# Patient Record
Sex: FEMALE | Race: WHITE
[De-identification: names, ages, dates, MRNs, and addresses within clinical notes are randomized per-mention and may not be internally consistent; named-entity substitution may affect disease eponyms.]

---

## 2018-03-19 ENCOUNTER — HOSPITAL ENCOUNTER (OUTPATIENT)
Dept: HOSPITAL 17 - NEPE | Age: 83
Setting detail: OBSERVATION
LOS: 9 days | Discharge: HOME | End: 2018-03-28
Attending: HOSPITALIST | Admitting: HOSPITALIST
Payer: MEDICARE

## 2018-03-19 VITALS
OXYGEN SATURATION: 94 % | DIASTOLIC BLOOD PRESSURE: 72 MMHG | HEART RATE: 60 BPM | RESPIRATION RATE: 18 BRPM | SYSTOLIC BLOOD PRESSURE: 148 MMHG | TEMPERATURE: 97.4 F

## 2018-03-19 VITALS — OXYGEN SATURATION: 97 %

## 2018-03-19 DIAGNOSIS — R94.31: ICD-10-CM

## 2018-03-19 DIAGNOSIS — F32.9: ICD-10-CM

## 2018-03-19 DIAGNOSIS — N20.0: ICD-10-CM

## 2018-03-19 DIAGNOSIS — E66.9: ICD-10-CM

## 2018-03-19 DIAGNOSIS — K64.4: ICD-10-CM

## 2018-03-19 DIAGNOSIS — R33.9: ICD-10-CM

## 2018-03-19 DIAGNOSIS — N17.9: ICD-10-CM

## 2018-03-19 DIAGNOSIS — E78.00: ICD-10-CM

## 2018-03-19 DIAGNOSIS — F03.90: ICD-10-CM

## 2018-03-19 DIAGNOSIS — K86.89: ICD-10-CM

## 2018-03-19 DIAGNOSIS — E11.51: ICD-10-CM

## 2018-03-19 DIAGNOSIS — M19.90: ICD-10-CM

## 2018-03-19 DIAGNOSIS — I12.9: ICD-10-CM

## 2018-03-19 DIAGNOSIS — Z86.73: ICD-10-CM

## 2018-03-19 DIAGNOSIS — Z95.0: ICD-10-CM

## 2018-03-19 DIAGNOSIS — E86.0: ICD-10-CM

## 2018-03-19 DIAGNOSIS — I72.3: ICD-10-CM

## 2018-03-19 DIAGNOSIS — K52.9: Primary | ICD-10-CM

## 2018-03-19 DIAGNOSIS — K57.30: ICD-10-CM

## 2018-03-19 DIAGNOSIS — K64.8: ICD-10-CM

## 2018-03-19 DIAGNOSIS — F41.9: ICD-10-CM

## 2018-03-19 DIAGNOSIS — R32: ICD-10-CM

## 2018-03-19 DIAGNOSIS — R94.5: ICD-10-CM

## 2018-03-19 DIAGNOSIS — I25.2: ICD-10-CM

## 2018-03-19 DIAGNOSIS — K80.50: ICD-10-CM

## 2018-03-19 DIAGNOSIS — J44.9: ICD-10-CM

## 2018-03-19 DIAGNOSIS — K21.9: ICD-10-CM

## 2018-03-19 DIAGNOSIS — M54.9: ICD-10-CM

## 2018-03-19 DIAGNOSIS — E11.22: ICD-10-CM

## 2018-03-19 DIAGNOSIS — N18.3: ICD-10-CM

## 2018-03-19 DIAGNOSIS — G89.29: ICD-10-CM

## 2018-03-19 DIAGNOSIS — Z86.711: ICD-10-CM

## 2018-03-19 PROCEDURE — 96368 THER/DIAG CONCURRENT INF: CPT

## 2018-03-19 PROCEDURE — 80076 HEPATIC FUNCTION PANEL: CPT

## 2018-03-19 PROCEDURE — 85610 PROTHROMBIN TIME: CPT

## 2018-03-19 PROCEDURE — 76705 ECHO EXAM OF ABDOMEN: CPT

## 2018-03-19 PROCEDURE — 86038 ANTINUCLEAR ANTIBODIES: CPT

## 2018-03-19 PROCEDURE — 80048 BASIC METABOLIC PNL TOTAL CA: CPT

## 2018-03-19 PROCEDURE — 97530 THERAPEUTIC ACTIVITIES: CPT

## 2018-03-19 PROCEDURE — 87506 IADNA-DNA/RNA PROBE TQ 6-11: CPT

## 2018-03-19 PROCEDURE — 80074 ACUTE HEPATITIS PANEL: CPT

## 2018-03-19 PROCEDURE — A9537 TC99M MEBROFENIN: HCPCS

## 2018-03-19 PROCEDURE — 93005 ELECTROCARDIOGRAM TRACING: CPT

## 2018-03-19 PROCEDURE — 86900 BLOOD TYPING SEROLOGIC ABO: CPT

## 2018-03-19 PROCEDURE — 43262 ENDO CHOLANGIOPANCREATOGRAPH: CPT

## 2018-03-19 PROCEDURE — 00811 ANES LWR INTST NDSC NOS: CPT

## 2018-03-19 PROCEDURE — 82948 REAGENT STRIP/BLOOD GLUCOSE: CPT

## 2018-03-19 PROCEDURE — 83605 ASSAY OF LACTIC ACID: CPT

## 2018-03-19 PROCEDURE — 88305 TISSUE EXAM BY PATHOLOGIST: CPT

## 2018-03-19 PROCEDURE — C1769 GUIDE WIRE: HCPCS

## 2018-03-19 PROCEDURE — 86850 RBC ANTIBODY SCREEN: CPT

## 2018-03-19 PROCEDURE — 82784 ASSAY IGA/IGD/IGG/IGM EACH: CPT

## 2018-03-19 PROCEDURE — 83516 IMMUNOASSAY NONANTIBODY: CPT

## 2018-03-19 PROCEDURE — 96367 TX/PROPH/DG ADDL SEQ IV INF: CPT

## 2018-03-19 PROCEDURE — 96361 HYDRATE IV INFUSION ADD-ON: CPT

## 2018-03-19 PROCEDURE — 85027 COMPLETE CBC AUTOMATED: CPT

## 2018-03-19 PROCEDURE — 45331 SIGMOIDOSCOPY AND BIOPSY: CPT

## 2018-03-19 PROCEDURE — 83540 ASSAY OF IRON: CPT

## 2018-03-19 PROCEDURE — 86255 FLUORESCENT ANTIBODY SCREEN: CPT

## 2018-03-19 PROCEDURE — 74177 CT ABD & PELVIS W/CONTRAST: CPT

## 2018-03-19 PROCEDURE — 83550 IRON BINDING TEST: CPT

## 2018-03-19 PROCEDURE — 80053 COMPREHEN METABOLIC PANEL: CPT

## 2018-03-19 PROCEDURE — 86901 BLOOD TYPING SEROLOGIC RH(D): CPT

## 2018-03-19 PROCEDURE — 96365 THER/PROPH/DIAG IV INF INIT: CPT

## 2018-03-19 PROCEDURE — C9113 INJ PANTOPRAZOLE SODIUM, VIA: HCPCS

## 2018-03-19 PROCEDURE — 97110 THERAPEUTIC EXERCISES: CPT

## 2018-03-19 PROCEDURE — 74330 X-RAY BILE/PANC ENDOSCOPY: CPT

## 2018-03-19 PROCEDURE — 36415 COLL VENOUS BLD VENIPUNCTURE: CPT

## 2018-03-19 PROCEDURE — 43277 ERCP EA DUCT/AMPULLA DILATE: CPT

## 2018-03-19 PROCEDURE — 97163 PT EVAL HIGH COMPLEX 45 MIN: CPT

## 2018-03-19 PROCEDURE — 83520 IMMUNOASSAY QUANT NOS NONAB: CPT

## 2018-03-19 PROCEDURE — 96372 THER/PROPH/DIAG INJ SC/IM: CPT

## 2018-03-19 PROCEDURE — 96366 THER/PROPH/DIAG IV INF ADDON: CPT

## 2018-03-19 PROCEDURE — 85025 COMPLETE CBC W/AUTO DIFF WBC: CPT

## 2018-03-19 PROCEDURE — 82103 ALPHA-1-ANTITRYPSIN TOTAL: CPT

## 2018-03-19 PROCEDURE — 43274 ERCP DUCT STENT PLACEMENT: CPT

## 2018-03-19 PROCEDURE — 78226 HEPATOBILIARY SYSTEM IMAGING: CPT

## 2018-03-19 PROCEDURE — 87493 C DIFF AMPLIFIED PROBE: CPT

## 2018-03-19 PROCEDURE — 43259 EGD US EXAM DUODENUM/JEJUNUM: CPT

## 2018-03-19 PROCEDURE — 00732 ANES UPR GI NDSC PX ERCP: CPT

## 2018-03-19 PROCEDURE — 96376 TX/PRO/DX INJ SAME DRUG ADON: CPT

## 2018-03-19 PROCEDURE — 96375 TX/PRO/DX INJ NEW DRUG ADDON: CPT

## 2018-03-19 PROCEDURE — 82728 ASSAY OF FERRITIN: CPT

## 2018-03-19 PROCEDURE — C2625 STENT, NON-COR, TEM W/DEL SY: HCPCS

## 2018-03-19 PROCEDURE — 82390 ASSAY OF CERULOPLASMIN: CPT

## 2018-03-19 PROCEDURE — G0378 HOSPITAL OBSERVATION PER HR: HCPCS

## 2018-03-20 VITALS
RESPIRATION RATE: 18 BRPM | SYSTOLIC BLOOD PRESSURE: 151 MMHG | HEART RATE: 60 BPM | DIASTOLIC BLOOD PRESSURE: 66 MMHG | OXYGEN SATURATION: 97 %

## 2018-03-20 VITALS
HEART RATE: 60 BPM | DIASTOLIC BLOOD PRESSURE: 65 MMHG | SYSTOLIC BLOOD PRESSURE: 144 MMHG | RESPIRATION RATE: 18 BRPM | OXYGEN SATURATION: 98 %

## 2018-03-20 VITALS
HEART RATE: 60 BPM | TEMPERATURE: 97.5 F | SYSTOLIC BLOOD PRESSURE: 124 MMHG | RESPIRATION RATE: 18 BRPM | DIASTOLIC BLOOD PRESSURE: 54 MMHG | OXYGEN SATURATION: 96 %

## 2018-03-20 VITALS
HEART RATE: 60 BPM | RESPIRATION RATE: 14 BRPM | SYSTOLIC BLOOD PRESSURE: 122 MMHG | TEMPERATURE: 98.6 F | OXYGEN SATURATION: 95 % | DIASTOLIC BLOOD PRESSURE: 58 MMHG

## 2018-03-20 VITALS
OXYGEN SATURATION: 98 % | HEART RATE: 60 BPM | SYSTOLIC BLOOD PRESSURE: 109 MMHG | DIASTOLIC BLOOD PRESSURE: 56 MMHG | TEMPERATURE: 97.4 F | RESPIRATION RATE: 19 BRPM

## 2018-03-20 VITALS
SYSTOLIC BLOOD PRESSURE: 178 MMHG | RESPIRATION RATE: 18 BRPM | DIASTOLIC BLOOD PRESSURE: 77 MMHG | HEART RATE: 61 BPM | OXYGEN SATURATION: 98 %

## 2018-03-20 VITALS — OXYGEN SATURATION: 96 % | HEART RATE: 60 BPM | RESPIRATION RATE: 18 BRPM

## 2018-03-20 VITALS
SYSTOLIC BLOOD PRESSURE: 129 MMHG | HEART RATE: 60 BPM | OXYGEN SATURATION: 98 % | DIASTOLIC BLOOD PRESSURE: 65 MMHG | RESPIRATION RATE: 22 BRPM

## 2018-03-20 VITALS — HEART RATE: 60 BPM

## 2018-03-20 LAB
ALBUMIN SERPL-MCNC: 2.1 GM/DL (ref 3.4–5)
ALP SERPL-CCNC: 139 U/L (ref 45–117)
ALT SERPL-CCNC: 103 U/L (ref 10–53)
AST SERPL-CCNC: 91 U/L (ref 15–37)
BASOPHILS # BLD AUTO: 0.2 TH/MM3 (ref 0–0.2)
BASOPHILS NFR BLD: 1.1 % (ref 0–2)
BILIRUB INDIRECT SERPL-MCNC: 0.3 MG/DL (ref 0–0.8)
BILIRUB SERPL-MCNC: 0.5 MG/DL (ref 0.2–1)
BUN SERPL-MCNC: 23 MG/DL (ref 7–18)
CALCIUM SERPL-MCNC: 8.8 MG/DL (ref 8.5–10.1)
CHLORIDE SERPL-SCNC: 105 MEQ/L (ref 98–107)
CREAT SERPL-MCNC: 1.19 MG/DL (ref 0.5–1)
DIRECT BILIRUBIN ADULT: 0.2 MG/DL (ref 0–0.2)
EOSINOPHIL # BLD: 0.1 TH/MM3 (ref 0–0.4)
EOSINOPHIL NFR BLD: 0.4 % (ref 0–4)
ERYTHROCYTE [DISTWIDTH] IN BLOOD BY AUTOMATED COUNT: 14 % (ref 11.6–17.2)
GFR SERPLBLD BASED ON 1.73 SQ M-ARVRAT: 43 ML/MIN (ref 89–?)
GLUCOSE SERPL-MCNC: 172 MG/DL (ref 74–106)
HCO3 BLD-SCNC: 22.6 MEQ/L (ref 21–32)
HCT VFR BLD CALC: 40.1 % (ref 35–46)
HGB BLD-MCNC: 13.6 GM/DL (ref 11.6–15.3)
INR PPP: 1 RATIO
LYMPHOCYTES # BLD AUTO: 3.7 TH/MM3 (ref 1–4.8)
LYMPHOCYTES NFR BLD AUTO: 20.7 % (ref 9–44)
MCH RBC QN AUTO: 29.4 PG (ref 27–34)
MCHC RBC AUTO-ENTMCNC: 33.9 % (ref 32–36)
MCV RBC AUTO: 86.9 FL (ref 80–100)
MONOCYTE #: 1.9 TH/MM3 (ref 0–0.9)
MONOCYTES NFR BLD: 10.4 % (ref 0–8)
NEUTROPHILS # BLD AUTO: 12.1 TH/MM3 (ref 1.8–7.7)
NEUTROPHILS NFR BLD AUTO: 67.4 % (ref 16–70)
PLATELET # BLD: 248 TH/MM3 (ref 150–450)
PMV BLD AUTO: 9.8 FL (ref 7–11)
PROT SERPL-MCNC: 6.3 GM/DL (ref 6.4–8.2)
PROTHROMBIN TIME: 10.6 SEC (ref 9.8–11.6)
RBC # BLD AUTO: 4.62 MIL/MM3 (ref 4–5.3)
SODIUM SERPL-SCNC: 138 MEQ/L (ref 136–145)
WBC # BLD AUTO: 18 TH/MM3 (ref 4–11)

## 2018-03-20 RX ADMIN — Medication SCH ML: at 08:02

## 2018-03-20 RX ADMIN — ACYCLOVIR SCH UNITS: 800 TABLET ORAL at 10:59

## 2018-03-20 RX ADMIN — INSULIN ASPART SCH: 100 INJECTION, SOLUTION INTRAVENOUS; SUBCUTANEOUS at 12:00

## 2018-03-20 RX ADMIN — SODIUM CHLORIDE SCH MLS/HR: 900 INJECTION, SOLUTION INTRAVENOUS at 08:04

## 2018-03-20 RX ADMIN — CIPROFLOXACIN SCH MLS/HR: 2 INJECTION, SOLUTION INTRAVENOUS at 09:04

## 2018-03-20 RX ADMIN — METOPROLOL TARTRATE SCH MG: 100 TABLET, FILM COATED ORAL at 22:32

## 2018-03-20 RX ADMIN — Medication SCH MG: at 23:04

## 2018-03-20 RX ADMIN — PANTOPRAZOLE SODIUM SCH MG: 40 INJECTION, POWDER, FOR SOLUTION INTRAVENOUS at 05:58

## 2018-03-20 RX ADMIN — METOPROLOL TARTRATE SCH MG: 100 TABLET, FILM COATED ORAL at 09:15

## 2018-03-20 RX ADMIN — INSULIN ASPART SCH: 100 INJECTION, SOLUTION INTRAVENOUS; SUBCUTANEOUS at 21:00

## 2018-03-20 RX ADMIN — SODIUM CHLORIDE AND POTASSIUM CHLORIDE SCH MLS/HR: 9; 1.49 INJECTION, SOLUTION INTRAVENOUS at 22:31

## 2018-03-20 RX ADMIN — Medication SCH ML: at 21:00

## 2018-03-20 RX ADMIN — SODIUM CHLORIDE SCH MLS/HR: 900 INJECTION, SOLUTION INTRAVENOUS at 13:14

## 2018-03-20 RX ADMIN — LEVOTHYROXINE SODIUM SCH MCG: 25 TABLET ORAL at 10:41

## 2018-03-20 RX ADMIN — ATORVASTATIN CALCIUM SCH MG: 20 TABLET, FILM COATED ORAL at 22:32

## 2018-03-20 RX ADMIN — CIPROFLOXACIN SCH MLS/HR: 2 INJECTION, SOLUTION INTRAVENOUS at 22:31

## 2018-03-20 RX ADMIN — PANTOPRAZOLE SODIUM SCH MG: 40 INJECTION, POWDER, FOR SOLUTION INTRAVENOUS at 18:40

## 2018-03-20 RX ADMIN — FAMOTIDINE SCH MG: 20 TABLET, FILM COATED ORAL at 10:41

## 2018-03-20 RX ADMIN — DONEPEZIL HYDROCHLORIDE SCH MG: 5 TABLET, FILM COATED ORAL at 22:32

## 2018-03-20 RX ADMIN — ACYCLOVIR SCH UNITS: 800 TABLET ORAL at 22:31

## 2018-03-20 RX ADMIN — ISOSORBIDE MONONITRATE SCH MG: 60 TABLET, EXTENDED RELEASE ORAL at 10:41

## 2018-03-20 RX ADMIN — GABAPENTIN SCH MG: 100 CAPSULE ORAL at 22:32

## 2018-03-20 RX ADMIN — INSULIN ASPART SCH: 100 INJECTION, SOLUTION INTRAVENOUS; SUBCUTANEOUS at 18:37

## 2018-03-20 RX ADMIN — DILTIAZEM HYDROCHLORIDE SCH MG: 240 CAPSULE, EXTENDED RELEASE ORAL at 10:41

## 2018-03-20 RX ADMIN — SODIUM CHLORIDE AND POTASSIUM CHLORIDE SCH MLS/HR: 9; 1.49 INJECTION, SOLUTION INTRAVENOUS at 10:41

## 2018-03-20 RX ADMIN — SODIUM CHLORIDE SCH MLS/HR: 900 INJECTION, SOLUTION INTRAVENOUS at 02:20

## 2018-03-20 NOTE — HHI.HP
__________________________________________________





[facility]


Service


[facility]


Primary Care Physician


Unknown


Admission Diagnosis





lower GI bleed, possible C. difficile colitis, leukocytosis


Diagnoses:  


Travel History


International Travel<30 Days:  No


Contact w/Intl Traveler <30 Da:  No


Traveled to Known Affected Are:  No


History of Present Illness


Is from patient, ER physician communication, interview of medical records.





Patient was sent from the nursing home because of 3 days duration of diarrhea 

which has number comes melena.


Patient herself is extremely poor historian.  She kept answering I do not 

remember to most of the questioning.


She is however states that she did have nausea vomiting and diarrhea.


She thinks that she has bleeding somewhere.  But she's not sure.


She reports that she is mostly bedbound at the nursing facility.


She relates not to give any proper history.





Review of Systems


ROS Limitations:  Poor Historian (limited historian.  Mostly answered I don't 

remember.)


Except as stated in HPI:  all other systems reviewed are Neg





Past Family Social History


Past Medical History


Hypertension


Diabetes


CVA 2.


COPD


Dementia


Past Surgical History


hysterectomy


appendectomy


Pacemaker placement 2016.


Cholecystectomy


Allergies:  


Coded Allergies:  


     acetaminophen (Unverified  Allergy, Severe, UNKNOWN, 8/15/17)


     oxycodone (Unverified  Allergy, Severe, UNKNOWN, 8/15/17)


     propoxyphene (Unverified  Allergy, Severe, UNKNOWN, 8/15/17)


     *MDRO Multi-Drug Resistant Organism (Verified  Adverse Reaction, Unknown, 6

/3/16)


 ESBL+E.Coli (urine-2016)


Family History


unknown.


Social History


unknown.





Physical Exam


Vital Signs





Vital Signs








  Date Time  Temp Pulse Resp B/P (MAP) Pulse Ox O2 Delivery O2 Flow Rate FiO2


 


3/20/18 04:14  60 18 144/65 (91) 98 Room Air  


 


3/20/18 02:25  61 18 178/77 (110) 98 Nasal Cannula 1.00 


 


3/19/18 23:49     97 Nasal Cannula 1.00 


 


3/19/18 23:44 97.4 60 18 148/72 (97) 94   








Physical Exam


GENERAL: This is a well-nourished, well-developed patient, in no apparent 

distress.


SKIN: No rashes, ecchymoses or lesions. Cool and dry.


HEAD: Atraumatic. Normocephalic. No temporal or scalp tenderness.


EYES: No scleral icterus. No injection or drainage. 


ENT: Nose without bleeding, purulent drainage or septal hematoma. Airway patent.


NECK: Trachea midline. No JVD y. Supple, nontender, no meningeal signs.


CARDIOVASCULAR: Regular rate and rhythm without murmurs, gallops, or rubs. 


RESPIRATORY: Clear to auscultation. Breath sounds equal bilaterally. No wheezes

, rales, or rhonchi.  


GASTROINTESTINAL: Abdomen soft, non-tender, nondistended.No guarding.


MUSCULOSKELETAL: Extremities without clubbing, cyanosis, or edema. No joint 

tenderness, effusion, or edema noted. No calf tenderness. Negative Homans sign 

bilaterally.


NEUROLOGICAL: Awake and alerMotor and sensory grossly within normal limits.  

Normal speech.


Laboratory





Laboratory Tests








Test


  3/19/18


23:45


 


White Blood Count 18.0 


 


Red Blood Count 4.62 


 


Hemoglobin 13.6 


 


Hematocrit 40.1 


 


Mean Corpuscular Volume 86.9 


 


Mean Corpuscular Hemoglobin 29.4 


 


Mean Corpuscular Hemoglobin


Concent 33.9 


 


 


Red Cell Distribution Width 14.0 


 


Platelet Count 248 


 


Mean Platelet Volume 9.8 


 


Neutrophils (%) (Auto) 67.4 


 


Lymphocytes (%) (Auto) 20.7 


 


Monocytes (%) (Auto) 10.4 


 


Eosinophils (%) (Auto) 0.4 


 


Basophils (%) (Auto) 1.1 


 


Neutrophils # (Auto) 12.1 


 


Lymphocytes # (Auto) 3.7 


 


Monocytes # (Auto) 1.9 


 


Eosinophils # (Auto) 0.1 


 


Basophils # (Auto) 0.2 


 


CBC Comment DIFF FINAL 


 


Differential Comment  


 


Prothrombin Time 10.6 


 


Prothromb Time International


Ratio 1.0 


 


 


Stool C. difficile Toxin (PCR) NEGATIVE 


 


Stl C. difficile Toxin


Epiderm 027 PRESUMPTIVE


NEGATIVE


 


Blood Urea Nitrogen 23 


 


Creatinine 1.19 


 


Random Glucose 172 


 


Calcium Level 8.8 


 


Sodium Level 138 


 


Potassium Level 3.5 


 


Chloride Level 105 


 


Carbon Dioxide Level 22.6 


 


Anion Gap 10 


 


Estimat Glomerular Filtration


Rate 43 


 








Result Diagram:  


3/19/18 2345                                                                   

             3/19/18 2345





Imaging





Last 48 hours Impressions








Abdomen/Pelvis CT 3/20/18 0000 Signed





Impressions: 





 Service Date/Time:  2018 07:49 - CONCLUSION:  1. Diffuse 





 thickening of the wall the rectum, sigmoid colon, descending colon, splenic 





 flexure and distal transverse colon suggestive of diffuse colitis. Clinical 





 correlation is recommended.  2. Choledocholithiasis without definite 





 obstruction. Correlation with alkaline phosphatase and bilirubin levels is 





 suggested.  3. 8 mm calcified stone within the left renal pelvis without 





 definite acute obstructive uropathy.  4. Mild chronic prominence of the 





 pelvicalyceal systems and ureters with diffuse thickening of the right 





 collecting system, ureter and urinary bladder wall suggesting possible chronic 





 inflammation.  5. Stable right adrenal nodule measuring 1.8 x 1.1 cm.  6. 





 Diffuse pancreatic atrophy. 7. Stable right common iliac artery aneurysm 





 measuring 2.6 cm in greatest dimension.     Robert W. Levy, MD Caprini VTE Risk Assessment


Caprini VTE Risk Assessment:  Mod/High Risk (score >= 2)


Caprini Risk Assessment Model











 Point Value = 1          Point Value = 2  Point Value = 3  Point Value = 5


 


Age 41-60


Minor surgery


BMI > 25 kg/m2


Swollen legs


Varicose veins


Pregnancy or postpartum


History of unexplained or recurrent


   spontaneous 


Oral contraceptives or hormone


   replacement


Sepsis (< 1 month)


Serious lung disease, including


   pneumonia (< 1 month)


Abnormal pulmonary function


Acute myocardial infarction


Congestive heart failure (< 1 month)


History of inflammatory bowel disease


Medical patient at bed rest Age 61-74


Arthroscopic surgery


Major open surgery (> 45 min)


Laparoscopic surgery (> 45 min)


Malignancy


Confined to bed (> 72 hours)


Immobilizing plaster cast


Central venous access Age >= 75


History of VTE


Family history of VTE


Factor V Leiden


Prothrombin 16606U


Lupus anticoagulant


Anticardiolipin antibodies


Elevated serum homocysteine


Heparin-induced thrombocytopenia


Other congenital or acquired


   thrombophilia Stroke (< 1 month)


Elective arthroplasty


Hip, pelvis, or leg fracture


Acute spinal cord injury (< 1 month)








Prophylaxis Regimen











   Total Risk


Factor Score Risk Level Prophylaxis Regimen


 


0-1      Low Early ambulation


 


2 Moderate Order ONE of the following:


*Sequential Compression Device (SCD)


*Heparin 5000 units SQ BID


 


3-4 Higher Order ONE of the following medications:


*Heparin 5000 units SQ TID


*Enoxaparin/Lovenox 40 mg SQ daily (WT < 150 kg, CrCl > 30 mL/min)


*Enoxaparin/Lovenox 30 mg SQ daily (WT < 150 kg, CrCl > 10-29 mL/min)


*Enoxaparin/Lovenox 30 mg SQ BID (WT < 150 kg, CrCl > 30 mL/min)


AND/OR


*Sequential Compression Device (SCD)


 


5 or more Highest Order ONE of the following medications:


*Heparin 5000 units SQ TID (Preferred with Epidurals)


*Enoxaparin/Lovenox 40 mg SQ daily (WT < 150 kg, CrCl > 30 mL/min)


*Enoxaparin/Lovenox 30 mg SQ daily (WT < 150 kg, CrCl > 10-29 mL/min)


*Enoxaparin/Lovenox 30 mg SQ BID (WT < 150 kg, CrCl > 30 mL/min)


AND


*Sequential Compression Device (SCD)











Assessment and Plan


Assessment and Plan


Impression:





Possible GI bleed


Possible C. difficile colitis versus other infectious colitis


Leukocytosis 


Abdominal pain on examination.  Poor historian.  Also with some palpable 

distention of the right upper quadrant.








Hypertension


Diabetes


CVA 2.


COPD


Chronic kidney disease


Dementia








Plan:





Nothing by mouth.


IV hydration.


GI consult.


Hold oral hypoglycemics.


Protonix 40 mg IV every 12 hours.


Obtain CT abdomen and pelvis with IV contrast to further since patient has 

significant pain on examination.


Until then, nothing by mouth.





Start patient on Cipro 400 with grams IV every 12 hours.


Flagyl 500 mg IV every 6 hours.


Stool studies including C. difficile.





DVT prophylaxis with SCD.


Code Status


DNR.  Patient has [facility] dnr paper in chart.  From nursing home.


Discussed Condition With


Patient, nursing staff, ER physician











George Ennis MD Mar 20, 2018 04:26

## 2018-03-20 NOTE — PD
HPI


Chief Complaint:  Bleeding


Time Seen by Provider:  23:45


Travel History


International Travel<30 days:  No


Contact w/Intl Traveler<30days:  No


Traveled to known affect area:  No





History of Present Illness


HPI


86-year-old female came to the emergency room sent from the nursing home by EMS 

for rectal bleed.  Patient has history of dementia and is not a good historian.

  As per the nursing home to the paramedics patient has had diarrhea for 3 days 

but today he started noticing blood in her stool.  Vital signs are stable.  No 

history of vomiting.  Patient is not on any blood thinners.  Patient otherwise 

does not look uncomfortable.  She is mostly nonverbal.





PFSH


Past Medical History


*** Narrative Medical


List of her past medical, surgical, social and family history reviewed from the 

nursing note


Arthritis:  Yes


Anxiety:  Yes


Depression:  Yes


Heart Rhythm Problems:  Yes (bradycardic )


Cancer:  No


Cardiovascular Problems:  Yes (PVD)


High Cholesterol:  Yes


COPD:  Yes


Cerebrovascular Accident:  Yes


Dementia:  Yes


Diabetes:  Yes


Patient Takes Glucophage:  Yes


Diminished Hearing:  No


Endocrine:  Yes


Genitourinary:  Yes


Hypertension:  Yes


Implanted Vascular Access Dvce:  Yes


Kidney Stones:  Yes


Medical other:  Yes (STROKE; GERD)


Musculoskeletal:  Yes (CHRONIC BACK PAIN)


Neurologic:  Yes


Respiratory:  Yes (PULMONARY EMBOLISM)


Integumentary:  Yes (SHINGLES)


Myocardial Infarction:  Yes


PNEUMOCCOCAL Vaccine (Year):  2


Menopausal:  Yes





Past Surgical History


Abdominal Surgery:  Yes (APPENDECTOMY)


Appendectomy:  Yes


Body Medical Devices:  pacemaker


Gynecologic Surgery:  Yes (HYSTERECTOMY)


Hysterectomy:  Yes


Pacemaker:  Yes


Other Surgery:  Yes (appendectomy/hysterectomy/cysto right ureteral stent/

pacemaker )





Social History


Alcohol Use:  No


Tobacco Use:  No


Substance Use:  No





Allergies-Medications


(Allergen,Severity, Reaction):  


Coded Allergies:  


     acetaminophen (Unverified  Allergy, Severe, UNKNOWN, 8/15/17)


     oxycodone (Unverified  Allergy, Severe, UNKNOWN, 8/15/17)


     propoxyphene (Unverified  Allergy, Severe, UNKNOWN, 8/15/17)


Comments


List of her allergies reviewed from the nursing note


Reported Meds & Prescriptions





Reported Meds & Active Scripts


Active


Reported


Polyethylene Glycol 3350 Powder (Polyethylene Glycol) 17 Gram Pow 17 Gm PO DAILY


Famotidine 20 Mg Tab 20 Mg PO DAILY


Biscolax Supp (Bisacodyl) 10 Mg Supp 10 Mg RECTAL DAILY PRN


Robafen Cough (Dextromethorphan HBr) 15 Mg Cap 10 Mg PO Q4HR PRN


Levothyroxine (Levothyroxine Sodium) 25 Mcg Tab 25 Mcg PO DAILY


Novolog Inj (Insulin Aspart) 1,000 Unit/10 Ml Vial 0 SQ AS DIRECTED


     Sliding Scale as directed.


Metformin (Metformin HCl) 850 Mg Tab 850 Mg PO BIDPC


Levemir Inj (Insulin Detemir) 1,000 unit/ 10 ML Vial 30 Units SQ HS


     Do not mix with any other Insulin.


Levemir Inj (Insulin Detemir) 1,000 unit/ 10 ML Vial 22 Units SQ DAILY


     Do not mix with any other Insulin.


Glucagen Hypokit Inj Kit (Glucagon (Rdna) Inj Kit) 1 Mg Kit 1 Mg IM ONCE PRN


Glutose 15 Gel (Dextrose) 40 % Gel 1 Tube  AS DIRECTED


Coal Tar (Crude) Topical 20% Soln 1 Applic TOPICAL AS DIRECTED


Norco (Hydrocodone-Acetaminophen) 5 Mg-325 Mg Tab 1 Tab PO Q6H PRN


Gabapentin 100 Mg Cap 100 Mg PO HS


Donepezil 10 Mg Tab 10 Mg PO HS


Niacin 100 Mg Tab 250 Mg PO HS


Isosorbide Mononitrate ER (Isosorbide Mononitrate) 60 Mg Tab 60 Mg PO DAILY


Acarbose 50 Mg Tab 50 Mg PO DAILY


Diltiazem ER 24  Mg Nicole 240 Mg PO DAILY


Vitamin D-3 (Cholecalciferol) 2,000 Unit Tab 50,000  TUESDAY


Lisinopril 10 Mg Tab 10 Mg PO DAILY


Metoprolol Tartrate 100 Mg Tab 100 Mg PO BID


Docusate Sodium 100 Mg Cap 100 Mg PO BID PRN


Atorvastatin (Atorvastatin Calcium) 20 Mg Tab 20 Mg PO HS





Narrative Medication


List of her home medications reviewed from the nursing note





Review of Systems


Except as stated in HPI:  all other systems reviewed are Neg


Gastrointestinal:  Positive: Diarrhea, Hematochezia





Physical Exam


Narrative


GENERAL: Awake, alert, dementia, elderly, nonverbal


SKIN: Focused skin assessment warm/dry.


HEAD: Atraumatic. Normocephalic. 


EYES: Pupils equal and round. No scleral icterus. No injection or drainage. 


ENT: No nasal bleeding or discharge.  Mucous membranes pink and moist.


NECK: Trachea midline. No JVD. 


CARDIOVASCULAR: Regular rate and rhythm.  No murmur appreciated.


RESPIRATORY: No accessory muscle use. Clear to auscultation. Breath sounds 

equal bilaterally. 


GASTROINTESTINAL: Abdomen soft, non-tender, nondistended. Hepatic and splenic 

margins not palpable. 


MUSCULOSKELETAL: No obvious deformities. No clubbing.  No cyanosis.  No edema. 


NEUROLOGICAL: Awake and alert, dementia. No obvious cranial nerve deficits.  

Motor grossly within normal limits.  Noncommunicative


PSYCHIATRIC: Appropriate mood and affect; insight and judgment normal.





Data


Data


Last Documented VS





Orders





 Orders


Basic Metabolic Panel (Bmp) (3/19/18 23:45)


Complete Blood Count With Diff (3/19/18 23:45)


Prothrombin Time / Inr (Pt) (3/19/18 23:45)


Type And Screen (3/19/18 23:45)


Ecg Monitoring (3/19/18 23:45)


Iv Access Insert/Monitor (3/19/18 23:45)


Oximetry (3/19/18 23:45)


Sodium Chlor 0.9% 1000 Ml Inj (Ns 1000 M (3/19/18 23:45)


Sodium Chloride 0.9% Flush (Ns Flush) (3/19/18 23:45)


C Diff Toxin Pcr (3/19/18 23:45)


Admit Order (Ed Use Only) (3/20/18 01:21)


Place In Observation (3/20/18 )


Vital Signs (Adult) Q4H (3/20/18 01:20)


Activity Oob With Assistance (3/20/18 01:20)


Cardiac Monitor / Telemetry .CONTINUOUS (3/20/18 01:20)


Sodium Chloride 0.9% Flush (Ns Flush) (3/20/18 01:30)


Sodium Chloride 0.9% Flush (Ns Flush) (3/20/18 09:00)


Basic Metabolic Panel (Bmp) (3/21/18 06:00)


Complete Blood Count With Diff (3/21/18 06:00)


Pt Request For Service (3/20/18 01:20)


Case Management Consult (3/20/18 01:20)


Naloxone Inj (Narcan Inj) (3/20/18 01:30)


Metronidazole 500 Mg Inj (Flagyl 500 Mg (3/20/18 02:00)





Labs





Laboratory Tests








Test


  3/19/18


23:45


 


White Blood Count 18.0 TH/MM3 


 


Red Blood Count 4.62 MIL/MM3 


 


Hemoglobin 13.6 GM/DL 


 


Hematocrit 40.1 % 


 


Mean Corpuscular Volume 86.9 FL 


 


Mean Corpuscular Hemoglobin 29.4 PG 


 


Mean Corpuscular Hemoglobin


Concent 33.9 % 


 


 


Red Cell Distribution Width 14.0 % 


 


Platelet Count 248 TH/MM3 


 


Mean Platelet Volume 9.8 FL 


 


Neutrophils (%) (Auto) 67.4 % 


 


Lymphocytes (%) (Auto) 20.7 % 


 


Monocytes (%) (Auto) 10.4 % 


 


Eosinophils (%) (Auto) 0.4 % 


 


Basophils (%) (Auto) 1.1 % 


 


Neutrophils # (Auto) 12.1 TH/MM3 


 


Lymphocytes # (Auto) 3.7 TH/MM3 


 


Monocytes # (Auto) 1.9 TH/MM3 


 


Eosinophils # (Auto) 0.1 TH/MM3 


 


Basophils # (Auto) 0.2 TH/MM3 


 


CBC Comment DIFF FINAL 


 


Differential Comment  


 


Prothrombin Time 10.6 SEC 


 


Prothromb Time International


Ratio 1.0 RATIO 


 


 


Stool C. difficile Toxin (PCR) NEGATIVE 


 


Stl C. difficile Toxin


Epiderm 027 PRESUMPTIVE


NEGATIVE


 


Blood Urea Nitrogen 23 MG/DL 


 


Creatinine 1.19 MG/DL 


 


Random Glucose 172 MG/DL 


 


Calcium Level 8.8 MG/DL 


 


Sodium Level 138 MEQ/L 


 


Potassium Level 3.5 MEQ/L 


 


Chloride Level 105 MEQ/L 


 


Carbon Dioxide Level 22.6 MEQ/L 


 


Anion Gap 10 MEQ/L 


 


Estimat Glomerular Filtration


Rate 43 ML/MIN 


 











MDM


Medical Decision Making


Medical Screen Exam Complete:  Yes


Emergency Medical Condition:  Yes


Medical Record Reviewed:  Yes


Differential Diagnosis


C. difficile colitis, lower GI bleed


Narrative Course


1:19 AM blood test results are back.  Patient has leukocytosis.  Have ordered 

for a C. difficile colitis but the results are pending.  Patient was given 1 L 

of IV fluid bolus.  In my opinion patient could be C. difficile colitis or just 

colitis.  I've admitted her.





Procedures


EKG Prior to Arrival:  No





Diagnosis





 Primary Impression:  


 Lower GI bleed


 Additional Impressions:  


 Leukocytosis


 Qualified Codes:  D72.829 - Elevated white blood cell count, unspecified


 possible C. difficile colitis





Admitting Information


Admitting Physician Requests:  Aliza Isaac MD Mar 20, 2018 01:12

## 2018-03-20 NOTE — PD.CONS
HPI


History of Present Illness


This is a 86 year old female with dementia who presented from her NH for rectal 

bleeding and diarrhea. GI has been consulted for GIB and colitis.  Reportedly 

she has had loose stool for 3 days and yesterday blood was noted in stool. She 

denies abd pain but is tender on exam diffusely and worse in the suprapubic 

region.  No reports of vomiting.  Not on blood thinners. 


 (Roslyn Chavez)





PFSH


Past Medical History


Hypertension


Diabetes


CVA 2.


COPD


Dementia


Past Surgical History


hysterectomy


appendectomy


Pacemaker placement August 1, 2016.


Cholecystectomy


 (Roslyn Chavez)


Coded Allergies:  


     acetaminophen (Unverified  Allergy, Severe, UNKNOWN, 8/15/17)


     oxycodone (Unverified  Allergy, Severe, UNKNOWN, 8/15/17)


     propoxyphene (Unverified  Allergy, Severe, UNKNOWN, 8/15/17)


     *MDRO Multi-Drug Resistant Organism (Verified  Adverse Reaction, Unknown, 6

/3/16)


 ESBL+E.Coli (urine-5/2016)


Family History


unknown.


Social History


unknown.


 (Roslyn Chavez)





Review of Systems


noncontributory/not consistent


 (Roslyn Chavez)





GI Exam


Vitals I&O





Vital Signs








  Date Time  Temp Pulse Resp B/P (MAP) Pulse Ox O2 Delivery O2 Flow Rate FiO2


 


3/20/18 07:38  60 18 151/66 (94) 97 Nasal Cannula 2.00 


 


3/20/18 06:01  60 18  96 Nasal Cannula 1.00 


 


3/20/18 04:14  60 18 144/65 (91) 98 Room Air  


 


3/20/18 02:25  61 18 178/77 (110) 98 Nasal Cannula 1.00 


 


3/19/18 23:49     97 Nasal Cannula 1.00 


 


3/19/18 23:44 97.4 60 18 148/72 (97) 94   














I/O      


 


 3/19/18 3/19/18 3/19/18 3/20/18 3/20/18 3/20/18





 06:59 14:59 22:59 06:59 14:59 22:59


 


Intake Total    1100 ml  


 


Balance    1100 ml  


 


      


 


Intake IV Total    1100 ml  


 


# Bowel Movements    1  








Imaging





Last Impressions








Abdomen/Pelvis CT 3/20/18 0000 Signed





Impressions: 





 Service Date/Time:  Tuesday, March 20, 2018 07:49 - CONCLUSION:  1. Diffuse 





 thickening of the wall the rectum, sigmoid colon, descending colon, splenic 





 flexure and distal transverse colon suggestive of diffuse colitis. Clinical 





 correlation is recommended.  2. Choledocholithiasis without definite 





 obstruction. Correlation with alkaline phosphatase and bilirubin levels is 





 suggested.  3. 8 mm calcified stone within the left renal pelvis without 





 definite acute obstructive uropathy.  4. Mild chronic prominence of the 





 pelvicalyceal systems and ureters with diffuse thickening of the right 





 collecting system, ureter and urinary bladder wall suggesting possible chronic 





 inflammation.  5. Stable right adrenal nodule measuring 1.8 x 1.1 cm.  6. 





 Diffuse pancreatic atrophy. 7. Stable right common iliac artery aneurysm 





 measuring 2.6 cm in greatest dimension.     Tian Hummel MD 








Laboratory











Test


  3/19/18


23:45


 


White Blood Count 18.0 TH/MM3 


 


Red Blood Count 4.62 MIL/MM3 


 


Hemoglobin 13.6 GM/DL 


 


Hematocrit 40.1 % 


 


Mean Corpuscular Volume 86.9 FL 


 


Mean Corpuscular Hemoglobin 29.4 PG 


 


Mean Corpuscular Hemoglobin


Concent 33.9 % 


 


 


Red Cell Distribution Width 14.0 % 


 


Platelet Count 248 TH/MM3 


 


Mean Platelet Volume 9.8 FL 


 


Neutrophils (%) (Auto) 67.4 % 


 


Lymphocytes (%) (Auto) 20.7 % 


 


Monocytes (%) (Auto) 10.4 % 


 


Eosinophils (%) (Auto) 0.4 % 


 


Basophils (%) (Auto) 1.1 % 


 


Neutrophils # (Auto) 12.1 TH/MM3 


 


Lymphocytes # (Auto) 3.7 TH/MM3 


 


Monocytes # (Auto) 1.9 TH/MM3 


 


Eosinophils # (Auto) 0.1 TH/MM3 


 


Basophils # (Auto) 0.2 TH/MM3 


 


CBC Comment DIFF FINAL 


 


Differential Comment  


 


Prothrombin Time 10.6 SEC 


 


Prothromb Time International


Ratio 1.0 RATIO 


 


 


Stool C. difficile Toxin (PCR) NEGATIVE 


 


Stl C. difficile Toxin


Epiderm 027 PRESUMPTIVE


NEGATIVE


 


Blood Urea Nitrogen 23 MG/DL 


 


Creatinine 1.19 MG/DL 


 


Random Glucose 172 MG/DL 


 


Calcium Level 8.8 MG/DL 


 


Sodium Level 138 MEQ/L 


 


Potassium Level 3.5 MEQ/L 


 


Chloride Level 105 MEQ/L 


 


Carbon Dioxide Level 22.6 MEQ/L 


 


Anion Gap 10 MEQ/L 


 


Estimat Glomerular Filtration


Rate 43 ML/MIN 


 








Physical Examination


HEENT: PERRL; normocephalic; atraumatic; no jaundice.  


CHEST:  CTA, diminished


CARDIAC:  RRR


ABDOMEN:  Soft, nondistended, diffuse TTP, significant TTP suprapubic region ; 

no hepatosplenomegaly; bowel sounds are present in all four quadrants.


EXTREMITIES: No clubbing, cyanosis, or edema.


SKIN:  Normal; no rash; no jaundice.


CNS:  somewhat confused but cooperative


 (Roslyn Chavez)





Assessment and Plan


Plan


ASSESSMENT


- loose stool, abd tenderness - for 3 days.  colitis.  CT suggestive diffuse 

colitis.  enteric path cultures & c diff pending.


- rectal bleeding - report of rectal bleeding yesterday.  HH WNL currently.


- abnormal imaging - CT indicated choledocholithiasis w/o definite obstruction.

  ALP mildly elevated


- leukocytosis - 2/2 colitis  on cipro, flagyl





PLAN


- await stool studies, c diff 


- consider colonoscopy drop in HH, active bleeding, or fails to improve


- continue abx


- CASPER


- monitor CBC


- further recs to follow


pt seen by myself and Dr Gonzalez and this note is on her behalf





 (Roslyn Chavez)


Physician Comments


seen, examined


agree with above 


 (Jolie Gonzalez MD)











Roslyn Chavez Mar 20, 2018 08:30


Jolie Gonzalez MD Mar 20, 2018 13:49

## 2018-03-20 NOTE — RADRPT
EXAM DATE/TIME:  03/20/2018 07:49 

 

[facility] COMPARISON:     

CT ABDOMEN & PELVIS W CONTRAST, May 31, 2016, 11:41.

 

 

INDICATIONS :     

Lower abdominal pain.  Diarrhea.  Rectal bleeding. 

                      

 

IV CONTRAST:     

85 cc Omnipaque 350 (iohexol) IV 

 

 

ORAL CONTRAST:      

Prescribed oral contrast ingested.

                      

 

RADIATION DOSE:     

15.18 CTDIvol (mGy) 

 

 

MEDICAL HISTORY :     

Cardiovascular disease. Hypertension. Diabetes mellitus type 1.

 

SURGICAL HISTORY :      

Appendectomy. 

 

ENCOUNTER:      

Initial

 

ACUITY:      

3 days

 

PAIN SCALE:      

4/10

 

LOCATION:       

Bilateral lower quadrant 

 

TECHNIQUE:     

Volumetric scanning of the abdomen and pelvis was performed.  Using automated exposure control and ad
justment of the mA and/or kV according to patient size, radiation dose was kept as low as reasonably 
achievable to obtain optimal diagnostic quality images.  DICOM format image data is available electro
nically for review and comparison.  

 

FINDINGS:     

 

LOWER LUNGS:     

Fibrotic scarring is noted within the lung bases. 

 

LIVER:     

Homogeneous density without lesion.  There is no dilation of the biliary tree. There are tiny calcifi
ed stones within the distal common bile duct without definite biliary ductal dilatation noted. Correl
ation with alkaline phosphatase and bilirubin levels is suggested to rule out subtle biliary obstruct
ion. Patient is status post cholecystectomy.

 

SPLEEN:     

Normal size without lesion.

 

PANCREAS:     

There is diffuse atrophy of the pancreas.

 

KIDNEYS:     

Again, there is chronic prominence of the collecting systems bilaterally without acute obstructive ur
opathy. There is an 8mm calcified calculus within the left renal pelvis. There is diffuse thickening 
of the wall of the right collecting system with mild chronic inflammatory change still noted.

 

ADRENAL GLANDS:     

There is a stable small right adrenal nodule measuring 1.8 x 1.1 cm consistent with probable adrenal 
adenoma. The left adrenal gland is normal.

 

VASCULAR:     

There is persistent aneurysmal dilatation of the right common iliac artery which measures 2.6 cm in g
reatest dimension.

 

BOWEL/MESENTERY:     

Diffuse thickening of the wall of the rectum, sigmoid colon, descending colon, splenic flexure and di
stal transverse colon are noted suggesting diffuse colitis. Clinical correlation is recommended.

 

ABDOMINAL WALL:     

Within normal limits.

 

RETROPERITONEUM:     

There is no lymphadenopathy. 

 

BLADDER:     

Urinary bladder wall thickening is noted and may represent chronic inflammatory change.

 

REPRODUCTIVE:     

Within normal limits.

 

INGUINAL:     

There is no lymphadenopathy or hernia. 

 

MUSCULOSKELETAL:     

Degenerative changes are noted throughout the lumbar and lower thoracic spine. Hemangioma involving L
2 is unchanged.

 

CONCLUSION:     

1. Diffuse thickening of the wall the rectum, sigmoid colon, descending colon, splenic flexure and di
stal transverse colon suggestive of diffuse colitis. Clinical correlation is recommended. 

2. Choledocholithiasis without definite obstruction. Correlation with alkaline phosphatase and biliru
bin levels is suggested. 

3. 8 mm calcified stone within the left renal pelvis without definite acute obstructive uropathy. 

4. Mild chronic prominence of the pelvicalyceal systems and ureters with diffuse thickening of the ri
ght collecting system, ureter and urinary bladder wall suggesting possible chronic inflammation. 

5. Stable right adrenal nodule measuring 1.8 x 1.1 cm. 

6. Diffuse pancreatic atrophy.

7. Stable right common iliac artery aneurysm measuring 2.6 cm in greatest dimension. 

 

 

 Tian Hummel MD on March 20, 2018 at 8:02           

Board Certified Radiologist.

 This report was verified electronically.

## 2018-03-20 NOTE — HHI.PR
Subjective


Remarks


Follow up for diarrhea, rectal bleeding. The patient has dementia and oriented 

to self and hospital only, therefore history is limited. She denies any current 

abdominal pain however has diffuse tenderness upon examination. She reports 

nausea but she is not sure if she vomited. She does remember having diarrhea 

and some bloody stools prior to coming to the hospital. She does not believe 

she is on any blood thinners or taking any NSAIDs recently. She states she hasn'

t been eating much lately and she does not know why. Denies fevers but does 

report chills. She states she is always cold. She says she has to go pee and 

has not urinated in awhile. Denies noticing any dysuria. RN reports urinary 

incontinence. No other medical complaints at this time.





Objective


Vitals





Vital Signs








  Date Time  Temp Pulse Resp B/P (MAP) Pulse Ox O2 Delivery O2 Flow Rate FiO2


 


3/20/18 07:38  60 18 151/66 (94) 97 Nasal Cannula 2.00 


 


3/20/18 06:01  60 18  96 Nasal Cannula 1.00 


 


3/20/18 04:14  60 18 144/65 (91) 98 Room Air  


 


3/20/18 02:25  61 18 178/77 (110) 98 Nasal Cannula 1.00 


 


3/19/18 23:49     97 Nasal Cannula 1.00 


 


3/19/18 23:44 97.4 60 18 148/72 (97) 94   














I/O      


 


 3/19/18 3/19/18 3/19/18 3/20/18 3/20/18 3/20/18





 07:00 15:00 23:00 07:00 15:00 23:00


 


Intake Total    1100 ml  


 


Balance    1100 ml  


 


      


 


Intake IV Total    1100 ml  


 


# Bowel Movements    1  








Result Diagram:  


3/19/18 2345                                                                   

             3/19/18 2345





Imaging





Last Impressions








Abdomen/Pelvis CT 3/20/18 0000 Signed





Impressions: 





 Service Date/Time:  Tuesday, March 20, 2018 07:49 - CONCLUSION:  1. Diffuse 





 thickening of the wall the rectum, sigmoid colon, descending colon, splenic 





 flexure and distal transverse colon suggestive of diffuse colitis. Clinical 





 correlation is recommended.  2. Choledocholithiasis without definite 





 obstruction. Correlation with alkaline phosphatase and bilirubin levels is 





 suggested.  3. 8 mm calcified stone within the left renal pelvis without 





 definite acute obstructive uropathy.  4. Mild chronic prominence of the 





 pelvicalyceal systems and ureters with diffuse thickening of the right 





 collecting system, ureter and urinary bladder wall suggesting possible chronic 





 inflammation.  5. Stable right adrenal nodule measuring 1.8 x 1.1 cm.  6. 





 Diffuse pancreatic atrophy. 7. Stable right common iliac artery aneurysm 





 measuring 2.6 cm in greatest dimension.     Tian Hummel MD 








Objective Remarks


GENERAL: Well-nourished, well-developed pleasantly confused elderly female 

patient in NAD.


SKIN: Warm and dry. No rash.


HEENT:  Normocephalic. Atraumatic.Pupils equal and round. Mucous membranes pink 

and moist.


CARDIOVASCULAR: Regular rate and rhythm.  S1, S2 noted. No murmur appreciated. 


RESPIRATORY: No accessory muscle use. Clear to auscultation. Breath sounds 

equal bilaterally.  


GASTROINTESTINAL: Abdomen soft, nondistended, diffuse TTP, worse throughout 

bilateral lower quadrants. Normoactive bowel sounds x4.


MUSCULOSKELETAL: No obvious deformities. Extremities without clubbing, cyanosis

, or edema. 


NEUROLOGICAL: Awake and alert. No obvious cranial nerve deficits.  Motor 

grossly within normal limits. 5/5 muscle strength in bilateral upper extremities

, 4/5 strength bilateral lower extremities.  Normal speech.


PSYCHIATRIC: Appropriate mood and affect; insight and judgment limited.


Medications and IVs





Current Medications








 Medications


  (Trade)  Dose


 Ordered  Sig/George


 Route  Start Time


 Stop Time Status Last Admin


 


  (NS Flush)  2 ml  UNSCH  PRN


 IV FLUSH  3/20/18 01:30


     


 


 


  (NS Flush)  2 ml  BID


 IV FLUSH  3/20/18 09:00


    3/20/18 08:02


 


 


  (Narcan Inj)  0.4 mg  UNSCH  PRN


 IV PUSH  3/20/18 01:30


     


 


 


 Metronidazole  100 ml @ 


 100 mls/hr  Q6H


 IV  3/20/18 02:00


    3/20/18 08:04


 


 


  (Protonix Inj)  40 mg  Q12H


 IV PUSH  3/20/18 06:00


    3/20/18 05:58


 


 


 Ciprofloxacin/


 Dextrose  200 ml @ 


 200 mls/hr  Q12H


 IV  3/20/18 08:00


     


 











A/P


Assessment and Plan


86-year-old female with history of dementia, HTN, DM, CVA x2, COPD, presents 

from nursing home with 3 days of diarrhea and 1 day of rectal bleeding





Acute Colitis: CT abd/pelvis images reviewed, shows diffuse thickening of the 

wall the rectum, sigmoid colon, descending colon, splenic flexure and distal 

transverse colon suggestive of diffuse colitis. 


   -C.difficile negative


   -Check stool cultures


   -+Leukocytosis with WBC 18K, Check lactic acid stat


   -Supportive treatment with IVF hydration w/NS +KCl @ 84cc/hr, antiemetics prn

, pain control prn


   -Continue antibiotics with IV Cipro and Flagyl


   -clear liquid diet for now, plan to advance diet as pain improves


   -GI Consulted





Rectal Bleeding: suspect secondary to colitis as above. 


   -Hgb stable at 13.6, continue to monitor


   -GI consulted, to consider colonoscopy if H&H drops





Choledocholithiasis: seen on CT, without definite obstruction. No evidence of 

jaundice.


   -check LFTs


   -GI consulted as above





Nephrolithiasis/Urinary Retention: CT also showed 8 mm calcified stone within 

the left renal pelvis without definite acute obstructive uropathy; also mild 

chronic prominence of the pelvicalyceal systems and ureters with diffuse 

thickening of the right collecting system, ureter and urinary bladder wall 

suggesting possible chronic inflammation. 


   -monitor urine output for now


   -bladder scan and straight cath if needed, discussed with RN





HEIKE on CKD stage III: Cr 1.19, previously 0.83 in Aug 2016. Suspect secondary 

to dehydration with recent diarrhea and poor oral intake. 


   -give IVF with NS +KCl at 84cc/hr


   -avoid nephrotoxins


   -holding patient's lisinopril


   -monitor BMP





HTN/HLD: chronic


   -continue patient's meds including metoprolol, cardizem, statin, niacin


   -holding lisinopril for now with mild HEIKE


   -monitor BP, adjust antihypertensives as needed





Diabetes Mellitus: chronic


   -continue patient's Levemir


   -hold patient's oral hypoglycemics including metformin


   -monitor Accu-cheks and cover with low dose SSI





Dementia: chronic


   -continue patient's Aricept


   -will need to return to nursing home at discharge





All other medical conditions stable, continue home medications as appropriate. 





DVT Prophylaxis: teds/SCDs; hold chemoprophylaxis with GI bleeding as above











Rocio Valdez PA-C Mar 20, 2018 8:53 am

## 2018-03-21 VITALS — HEART RATE: 63 BPM

## 2018-03-21 VITALS
RESPIRATION RATE: 20 BRPM | TEMPERATURE: 97.5 F | SYSTOLIC BLOOD PRESSURE: 112 MMHG | DIASTOLIC BLOOD PRESSURE: 57 MMHG | HEART RATE: 60 BPM | OXYGEN SATURATION: 91 %

## 2018-03-21 VITALS
TEMPERATURE: 97.4 F | OXYGEN SATURATION: 92 % | DIASTOLIC BLOOD PRESSURE: 59 MMHG | SYSTOLIC BLOOD PRESSURE: 125 MMHG | HEART RATE: 60 BPM | RESPIRATION RATE: 16 BRPM

## 2018-03-21 VITALS
RESPIRATION RATE: 18 BRPM | SYSTOLIC BLOOD PRESSURE: 110 MMHG | DIASTOLIC BLOOD PRESSURE: 54 MMHG | TEMPERATURE: 98.2 F | OXYGEN SATURATION: 95 % | HEART RATE: 60 BPM

## 2018-03-21 VITALS
RESPIRATION RATE: 14 BRPM | OXYGEN SATURATION: 98 % | HEART RATE: 60 BPM | TEMPERATURE: 97.8 F | SYSTOLIC BLOOD PRESSURE: 147 MMHG | DIASTOLIC BLOOD PRESSURE: 67 MMHG

## 2018-03-21 VITALS — HEART RATE: 60 BPM

## 2018-03-21 LAB
ALBUMIN SERPL-MCNC: 2.1 GM/DL (ref 3.4–5)
ALP SERPL-CCNC: 117 U/L (ref 45–117)
ALT SERPL-CCNC: 78 U/L (ref 10–53)
AST SERPL-CCNC: 53 U/L (ref 15–37)
BASOPHILS # BLD AUTO: 0.1 TH/MM3 (ref 0–0.2)
BASOPHILS NFR BLD: 0.7 % (ref 0–2)
BILIRUB INDIRECT SERPL-MCNC: 0.1 MG/DL (ref 0–0.8)
BILIRUB SERPL-MCNC: 0.2 MG/DL (ref 0.2–1)
BUN SERPL-MCNC: 16 MG/DL (ref 7–18)
CALCIUM SERPL-MCNC: 7.6 MG/DL (ref 8.5–10.1)
CHLORIDE SERPL-SCNC: 112 MEQ/L (ref 98–107)
CREAT SERPL-MCNC: 0.88 MG/DL (ref 0.5–1)
DIRECT BILIRUBIN ADULT: 0.1 MG/DL (ref 0–0.2)
EOSINOPHIL # BLD: 0.1 TH/MM3 (ref 0–0.4)
EOSINOPHIL NFR BLD: 1.4 % (ref 0–4)
ERYTHROCYTE [DISTWIDTH] IN BLOOD BY AUTOMATED COUNT: 14.1 % (ref 11.6–17.2)
GFR SERPLBLD BASED ON 1.73 SQ M-ARVRAT: 61 ML/MIN (ref 89–?)
GLUCOSE SERPL-MCNC: 68 MG/DL (ref 74–106)
HCO3 BLD-SCNC: 21.8 MEQ/L (ref 21–32)
HCT VFR BLD CALC: 33.3 % (ref 35–46)
HGB BLD-MCNC: 11.7 GM/DL (ref 11.6–15.3)
LYMPHOCYTES # BLD AUTO: 1.9 TH/MM3 (ref 1–4.8)
LYMPHOCYTES NFR BLD AUTO: 20.1 % (ref 9–44)
MCH RBC QN AUTO: 30.9 PG (ref 27–34)
MCHC RBC AUTO-ENTMCNC: 35.2 % (ref 32–36)
MCV RBC AUTO: 87.8 FL (ref 80–100)
MONOCYTE #: 0.7 TH/MM3 (ref 0–0.9)
MONOCYTES NFR BLD: 7.5 % (ref 0–8)
NEUTROPHILS # BLD AUTO: 6.6 TH/MM3 (ref 1.8–7.7)
NEUTROPHILS NFR BLD AUTO: 70.3 % (ref 16–70)
PLATELET # BLD: 209 TH/MM3 (ref 150–450)
PMV BLD AUTO: 9.7 FL (ref 7–11)
PROT SERPL-MCNC: 5.8 GM/DL (ref 6.4–8.2)
RBC # BLD AUTO: 3.79 MIL/MM3 (ref 4–5.3)
SODIUM SERPL-SCNC: 144 MEQ/L (ref 136–145)
WBC # BLD AUTO: 9.4 TH/MM3 (ref 4–11)

## 2018-03-21 RX ADMIN — ISOSORBIDE MONONITRATE SCH MG: 60 TABLET, EXTENDED RELEASE ORAL at 06:12

## 2018-03-21 RX ADMIN — SODIUM CHLORIDE AND POTASSIUM CHLORIDE SCH MLS/HR: 9; 1.49 INJECTION, SOLUTION INTRAVENOUS at 20:57

## 2018-03-21 RX ADMIN — DONEPEZIL HYDROCHLORIDE SCH MG: 5 TABLET, FILM COATED ORAL at 21:00

## 2018-03-21 RX ADMIN — ACYCLOVIR SCH UNITS: 800 TABLET ORAL at 11:00

## 2018-03-21 RX ADMIN — PANTOPRAZOLE SODIUM SCH MG: 40 INJECTION, POWDER, FOR SOLUTION INTRAVENOUS at 06:12

## 2018-03-21 RX ADMIN — LEVOTHYROXINE SODIUM SCH MCG: 25 TABLET ORAL at 06:12

## 2018-03-21 RX ADMIN — METOPROLOL TARTRATE SCH MG: 100 TABLET, FILM COATED ORAL at 09:13

## 2018-03-21 RX ADMIN — Medication SCH MG: at 20:59

## 2018-03-21 RX ADMIN — SODIUM CHLORIDE SCH MLS/HR: 900 INJECTION, SOLUTION INTRAVENOUS at 13:52

## 2018-03-21 RX ADMIN — INSULIN ASPART SCH: 100 INJECTION, SOLUTION INTRAVENOUS; SUBCUTANEOUS at 12:00

## 2018-03-21 RX ADMIN — INSULIN ASPART SCH: 100 INJECTION, SOLUTION INTRAVENOUS; SUBCUTANEOUS at 22:07

## 2018-03-21 RX ADMIN — SODIUM CHLORIDE AND POTASSIUM CHLORIDE SCH MLS/HR: 9; 1.49 INJECTION, SOLUTION INTRAVENOUS at 09:05

## 2018-03-21 RX ADMIN — FAMOTIDINE SCH MG: 20 TABLET, FILM COATED ORAL at 09:13

## 2018-03-21 RX ADMIN — METOPROLOL TARTRATE SCH MG: 100 TABLET, FILM COATED ORAL at 21:00

## 2018-03-21 RX ADMIN — SODIUM CHLORIDE SCH MLS/HR: 900 INJECTION, SOLUTION INTRAVENOUS at 20:56

## 2018-03-21 RX ADMIN — Medication SCH ML: at 09:00

## 2018-03-21 RX ADMIN — ACYCLOVIR SCH UNITS: 800 TABLET ORAL at 21:00

## 2018-03-21 RX ADMIN — DILTIAZEM HYDROCHLORIDE SCH MG: 240 CAPSULE, EXTENDED RELEASE ORAL at 09:13

## 2018-03-21 RX ADMIN — INSULIN ASPART SCH: 100 INJECTION, SOLUTION INTRAVENOUS; SUBCUTANEOUS at 08:00

## 2018-03-21 RX ADMIN — PANTOPRAZOLE SODIUM SCH MG: 40 INJECTION, POWDER, FOR SOLUTION INTRAVENOUS at 18:00

## 2018-03-21 RX ADMIN — CIPROFLOXACIN SCH MLS/HR: 2 INJECTION, SOLUTION INTRAVENOUS at 09:12

## 2018-03-21 RX ADMIN — SODIUM CHLORIDE SCH MLS/HR: 900 INJECTION, SOLUTION INTRAVENOUS at 01:17

## 2018-03-21 RX ADMIN — Medication SCH ML: at 20:59

## 2018-03-21 RX ADMIN — CIPROFLOXACIN SCH MLS/HR: 2 INJECTION, SOLUTION INTRAVENOUS at 22:06

## 2018-03-21 RX ADMIN — SODIUM CHLORIDE SCH MLS/HR: 900 INJECTION, SOLUTION INTRAVENOUS at 04:33

## 2018-03-21 RX ADMIN — GABAPENTIN SCH MG: 100 CAPSULE ORAL at 21:00

## 2018-03-21 RX ADMIN — ATORVASTATIN CALCIUM SCH MG: 20 TABLET, FILM COATED ORAL at 20:59

## 2018-03-21 RX ADMIN — INSULIN ASPART SCH: 100 INJECTION, SOLUTION INTRAVENOUS; SUBCUTANEOUS at 17:00

## 2018-03-21 RX ADMIN — SODIUM CHLORIDE SCH MLS/HR: 900 INJECTION, SOLUTION INTRAVENOUS at 10:55

## 2018-03-21 NOTE — HHI.GIFU
Subjective


Remarks


Pt resting in bed.


Confused but pleasant.


Eating lunch during my exam.


Two stool documented overnight


Denies nausea, vomiting, abdominal pain 


 (Massiel Caballero)





Objective


Vitals I&O





Vital Signs








  Date Time  Temp Pulse Resp B/P (MAP) Pulse Ox O2 Delivery O2 Flow Rate FiO2


 


3/21/18 11:58 98.2 60 18 110/54 (72) 95   


 


3/21/18 07:33 97.5 60 20 112/57 (75) 91   


 


3/21/18 04:05  60      


 


3/21/18 00:10  63      


 


3/20/18 22:22 97.5 60 18 124/54 (77) 96   


 


3/20/18 19:55  60      


 


3/20/18 18:01  60      














I/O      


 


 3/20/18 3/20/18 3/20/18 3/21/18 3/21/18 3/21/18





 07:00 15:00 23:00 07:00 15:00 23:00


 


Intake Total 1100 ml 400 ml    


 


Balance 1100 ml 400 ml    


 


      


 


Intake IV Total 1100 ml 400 ml    


 


# Voids    3  


 


# Bowel Movements 1   2  








Laboratory





Laboratory Tests








Test


  3/21/18


06:40


 


White Blood Count 9.4 


 


Red Blood Count 3.79 


 


Hemoglobin 11.7 


 


Hematocrit 33.3 


 


Mean Corpuscular Volume 87.8 


 


Mean Corpuscular Hemoglobin 30.9 


 


Mean Corpuscular Hemoglobin


Concent 35.2 


 


 


Red Cell Distribution Width 14.1 


 


Platelet Count 209 


 


Mean Platelet Volume 9.7 


 


Neutrophils (%) (Auto) 70.3 


 


Lymphocytes (%) (Auto) 20.1 


 


Monocytes (%) (Auto) 7.5 


 


Eosinophils (%) (Auto) 1.4 


 


Basophils (%) (Auto) 0.7 


 


Neutrophils # (Auto) 6.6 


 


Lymphocytes # (Auto) 1.9 


 


Monocytes # (Auto) 0.7 


 


Eosinophils # (Auto) 0.1 


 


Basophils # (Auto) 0.1 


 


CBC Comment DIFF FINAL 


 


Differential Comment  


 


Blood Urea Nitrogen 16 


 


Creatinine 0.88 


 


Random Glucose 68 


 


Calcium Level 7.6 


 


Sodium Level 144 


 


Potassium Level 3.7 


 


Chloride Level 112 


 


Carbon Dioxide Level 21.8 


 


Anion Gap 10 


 


Estimat Glomerular Filtration


Rate 61 


 


 


Total Bilirubin 0.2 


 


Direct Bilirubin 0.1 


 


Indirect Bilirubin 0.1 


 


Aspartate Amino Transf


(AST/SGOT) 53 


 


 


Alanine Aminotransferase


(ALT/SGPT) 78 


 


 


Alkaline Phosphatase 117 


 


Total Protein 5.8 


 


Albumin 2.1 














 Date/Time


Source Procedure


Growth Status


 


 


 3/21/18 09:00


Stool Stool 


Pending Received








Imaging





Last Impressions








Abdomen/Pelvis CT 3/20/18 0000 Signed





Impressions: 





 Service Date/Time:  Tuesday, March 20, 2018 07:49 - CONCLUSION:  1. Diffuse 





 thickening of the wall the rectum, sigmoid colon, descending colon, splenic 





 flexure and distal transverse colon suggestive of diffuse colitis. Clinical 





 correlation is recommended.  2. Choledocholithiasis without definite 





 obstruction. Correlation with alkaline phosphatase and bilirubin levels is 





 suggested.  3. 8 mm calcified stone within the left renal pelvis without 





 definite acute obstructive uropathy.  4. Mild chronic prominence of the 





 pelvicalyceal systems and ureters with diffuse thickening of the right 





 collecting system, ureter and urinary bladder wall suggesting possible chronic 





 inflammation.  5. Stable right adrenal nodule measuring 1.8 x 1.1 cm.  6. 





 Diffuse pancreatic atrophy. 7. Stable right common iliac artery aneurysm 





 measuring 2.6 cm in greatest dimension.     Tian Hummel MD 








Physical Exam


HEENT:Normocephalic; atraumatic


CHEST: Even/unlabored 


CARDIAC:  RRR


ABDOMEN:  Soft, nondistended, nontender; bowel sounds active 


EXTREMITIES: No clubbing, cyanosis, or edema.


SKIN:  Normal; no rash; no jaundice.


CNS:  Pleasantly confused, alert and oriented times x 2 


 (Massiel Caballero)





Assessment and Plan


Plan


ASSESSMENT


- CT findings suggestive of colitis- Diffuse thickening of wall the rectum, 

sigmoid colon,


  descending colon, splenic flexure and distal transverse colon suggestive of 

diffuse colitis.


  C. Diff negative.  Stool enteric pathogens pending. Leukocytosis improving. 

Pt on Cipro


  and Flagyl.  


- Reports of rectal bleeding from rehab- H/H stable on admission- some drop 

today but


  remains stable WNL.  No reports of further rectal bleeding. 


- CT findings suggestive of choledocholithiasis- Alk phos now WNL.  LFTs 

trending down. 


  B bili WNL.  





PLAN


- Enteric pathogens pending


- Monitor LFTs 


- Monitor H/H


- Notify GI of active bleeding


- Flagyl


- Cipro


- CASPER


- Colonoscopy if further bleeding or drop in H/H


- Further recommendations based on findings of above and clinical course





Pt has been seen and examined by myself and Dr. Gonzalez and this note is written 

on her behalf 





 (Massiel Caballero)


Physician Comments


seen, examined


ct showed colitis- stool studies negative


flexisigmoidoscopy at least vs colonoscopy if family agrees 


cbd stones , no obstruction-we will discuss with family about mrcp/ercp-a this 

time not available 


 (Jolie Gonzalez MD)











Massiel Caballero Mar 21, 2018 15:49


Jolie Gonzalez MD Mar 21, 2018 18:19

## 2018-03-21 NOTE — HHI.PR
Subjective


Remarks


She is in bed.  Appears very weak.  Has a weak voice.  Says she feels hungry.  

Like to try regular food.  However she still nauseated and still diarrhea.  

Abdominal pain improved.  Did not vomit so far.  No fever or chills overnight.  

No chest pain or shortness of breath.





Objective


Vitals





Vital Signs








  Date Time  Temp Pulse Resp B/P (MAP) Pulse Ox O2 Delivery O2 Flow Rate FiO2


 


3/21/18 11:58 98.2 60 18 110/54 (72) 95   


 


3/21/18 07:33 97.5 60 20 112/57 (75) 91   


 


3/21/18 04:05  60      


 


3/21/18 00:10  63      


 


3/20/18 22:22 97.5 60 18 124/54 (77) 96   


 


3/20/18 19:55  60      


 


3/20/18 18:01  60      


 


3/20/18 15:19 98.6 60 14 122/58 (79) 95   


 


3/20/18 14:17        














I/O      


 


 3/20/18 3/20/18 3/20/18 3/21/18 3/21/18 3/21/18





 07:00 15:00 23:00 07:00 15:00 23:00


 


Intake Total 1100 ml 400 ml    


 


Balance 1100 ml 400 ml    


 


      


 


Intake IV Total 1100 ml 400 ml    


 


# Voids    3  


 


# Bowel Movements 1   2  








Result Diagram:  


3/21/18 0640                                                                   

             3/21/18 0640





Imaging





Last Impressions








Abdomen/Pelvis CT 3/20/18 0000 Signed





Impressions: 





 Service Date/Time:  Tuesday, March 20, 2018 07:49 - CONCLUSION:  1. Diffuse 





 thickening of the wall the rectum, sigmoid colon, descending colon, splenic 





 flexure and distal transverse colon suggestive of diffuse colitis. Clinical 





 correlation is recommended.  2. Choledocholithiasis without definite 





 obstruction. Correlation with alkaline phosphatase and bilirubin levels is 





 suggested.  3. 8 mm calcified stone within the left renal pelvis without 





 definite acute obstructive uropathy.  4. Mild chronic prominence of the 





 pelvicalyceal systems and ureters with diffuse thickening of the right 





 collecting system, ureter and urinary bladder wall suggesting possible chronic 





 inflammation.  5. Stable right adrenal nodule measuring 1.8 x 1.1 cm.  6. 





 Diffuse pancreatic atrophy. 7. Stable right common iliac artery aneurysm 





 measuring 2.6 cm in greatest dimension.     Tian Hummel MD 








Objective Remarks


GENERAL: Well-nourished, well-developed pleasantly confused elderly female 

patient in NAD.


CARDIOVASCULAR: Regular rate and rhythm.  S1, S2 noted. No murmur appreciated. 


RESPIRATORY: No accessory muscle use. Clear to auscultation. Breath sounds 

equal bilaterally.  


GASTROINTESTINAL: Abdomen soft, nondistended, diffuse TTP, worse throughout 

bilateral lower quadrants. Normoactive bowel sounds x4.


MUSCULOSKELETAL: No obvious deformities. Extremities without clubbing, cyanosis

, or edema. 


NEUROLOGICAL: Awake and alert. No obvious cranial nerve deficits.  Motor 

grossly within normal limits. 5/5 muscle strength in bilateral upper extremities

, 4/5 strength bilateral lower extremities.  Normal speech.


PSYCHIATRIC: Appropriate mood and affect; insight and judgment limited.








A/P


Assessment and Plan


86-year-old female with history of dementia, HTN, DM, CVA x2, COPD, presents 

from nursing home with 3 days of diarrhea and 1 day of rectal bleeding





Acute Colitis: CT abd/pelvis images reviewed, shows diffuse thickening of the 

wall the rectum, sigmoid colon, descending colon, splenic flexure and distal 

transverse colon suggestive of diffuse colitis. 


   -C.difficile negative


   -Stool cultures are pending


   -+Leukocytosis with WBC 18K, Check lactic acid stat


   -Supportive treatment with IVF hydration w/NS +KCl @ 84cc/hr, antiemetics prn

, pain control prn.  Continue IV diarrhea.  Monitor electrolytes and replace as 

needed


   -Continue antibiotics with IV Cipro and Flagyl


   - advance diet as tolerated.


   -GI Consulted





Rectal Bleeding: suspect secondary to colitis as above. 


   -Hgb stable at 13.6, continue to monitor


   -GI consulted, to consider colonoscopy if H&H drops





Choledocholithiasis: seen on CT, without definite obstruction. No evidence of 

jaundice.


   -check LFTs


   -GI consulted as above





Nephrolithiasis/Urinary Retention: CT also showed 8 mm calcified stone within 

the left renal pelvis without definite acute obstructive uropathy; also mild 

chronic prominence of the pelvicalyceal systems and ureters with diffuse 

thickening of the right collecting system, ureter and urinary bladder wall 

suggesting possible chronic inflammation. 


   -monitor urine output for now


   -bladder scan and straight cath if needed, discussed with RN





HEIKE on CKD stage III: Cr 1.19, previously 0.83 in Aug 2016. Suspect secondary 

to dehydration with recent diarrhea and poor oral intake. 


   -give IVF with NS +KCl at 84cc/hr


   -avoid nephrotoxins


   -holding patient's lisinopril


   -monitor BMP





HTN/HLD: chronic


   -continue patient's meds including metoprolol, cardizem, statin, niacin


   -holding lisinopril for now with mild HEIKE


   -monitor BP, adjust antihypertensives as needed





Diabetes Mellitus: chronic


   -continue patient's Levemir


   -hold patient's oral hypoglycemics including metformin


   -monitor Accu-cheks and cover with low dose SSI





Dementia: chronic


   -continue patient's Aricept


   -will need to return to nursing home at discharge





All other medical conditions stable, continue home medications as appropriate. 





DVT Prophylaxis: teds/SCDs; hold chemoprophylaxis with GI bleeding as above





DC plan.  Pending improvement and clearance by consultants











Beryl Parra MD Mar 21, 2018 13:24

## 2018-03-22 VITALS
SYSTOLIC BLOOD PRESSURE: 127 MMHG | OXYGEN SATURATION: 95 % | DIASTOLIC BLOOD PRESSURE: 62 MMHG | HEART RATE: 60 BPM | RESPIRATION RATE: 20 BRPM | TEMPERATURE: 97.5 F

## 2018-03-22 VITALS
TEMPERATURE: 97.5 F | HEART RATE: 60 BPM | DIASTOLIC BLOOD PRESSURE: 67 MMHG | OXYGEN SATURATION: 95 % | RESPIRATION RATE: 18 BRPM | SYSTOLIC BLOOD PRESSURE: 150 MMHG

## 2018-03-22 VITALS
SYSTOLIC BLOOD PRESSURE: 139 MMHG | DIASTOLIC BLOOD PRESSURE: 62 MMHG | RESPIRATION RATE: 20 BRPM | OXYGEN SATURATION: 95 % | TEMPERATURE: 97.4 F | HEART RATE: 60 BPM

## 2018-03-22 VITALS
RESPIRATION RATE: 20 BRPM | DIASTOLIC BLOOD PRESSURE: 67 MMHG | HEART RATE: 60 BPM | OXYGEN SATURATION: 96 % | SYSTOLIC BLOOD PRESSURE: 145 MMHG

## 2018-03-22 LAB
ALBUMIN SERPL-MCNC: 2 GM/DL (ref 3.4–5)
ALP SERPL-CCNC: 110 U/L (ref 45–117)
ALT SERPL-CCNC: 53 U/L (ref 10–53)
AST SERPL-CCNC: 46 U/L (ref 15–37)
BASOPHILS # BLD AUTO: 0.1 TH/MM3 (ref 0–0.2)
BASOPHILS NFR BLD: 0.7 % (ref 0–2)
BILIRUB INDIRECT SERPL-MCNC: 0.2 MG/DL (ref 0–0.8)
BILIRUB SERPL-MCNC: 0.3 MG/DL (ref 0.2–1)
BUN SERPL-MCNC: 8 MG/DL (ref 7–18)
CALCIUM SERPL-MCNC: 8.1 MG/DL (ref 8.5–10.1)
CHLORIDE SERPL-SCNC: 111 MEQ/L (ref 98–107)
CREAT SERPL-MCNC: 0.92 MG/DL (ref 0.5–1)
DIRECT BILIRUBIN ADULT: (no result) MG/DL (ref 0–0.2)
EOSINOPHIL # BLD: 0.2 TH/MM3 (ref 0–0.4)
EOSINOPHIL NFR BLD: 2.4 % (ref 0–4)
ERYTHROCYTE [DISTWIDTH] IN BLOOD BY AUTOMATED COUNT: 14.1 % (ref 11.6–17.2)
GFR SERPLBLD BASED ON 1.73 SQ M-ARVRAT: 58 ML/MIN (ref 89–?)
GLUCOSE SERPL-MCNC: 202 MG/DL (ref 74–106)
HCO3 BLD-SCNC: 21.6 MEQ/L (ref 21–32)
HCT VFR BLD CALC: 34.7 % (ref 35–46)
HGB BLD-MCNC: 11.6 GM/DL (ref 11.6–15.3)
LYMPHOCYTES # BLD AUTO: 2.1 TH/MM3 (ref 1–4.8)
LYMPHOCYTES NFR BLD AUTO: 26.5 % (ref 9–44)
MCH RBC QN AUTO: 29.4 PG (ref 27–34)
MCHC RBC AUTO-ENTMCNC: 33.5 % (ref 32–36)
MCV RBC AUTO: 87.9 FL (ref 80–100)
MONOCYTE #: 0.7 TH/MM3 (ref 0–0.9)
MONOCYTES NFR BLD: 8.9 % (ref 0–8)
NEUTROPHILS # BLD AUTO: 4.9 TH/MM3 (ref 1.8–7.7)
NEUTROPHILS NFR BLD AUTO: 61.5 % (ref 16–70)
PLATELET # BLD: 235 TH/MM3 (ref 150–450)
PMV BLD AUTO: 9.5 FL (ref 7–11)
PROT SERPL-MCNC: 5.9 GM/DL (ref 6.4–8.2)
RBC # BLD AUTO: 3.94 MIL/MM3 (ref 4–5.3)
SODIUM SERPL-SCNC: 142 MEQ/L (ref 136–145)
WBC # BLD AUTO: 7.9 TH/MM3 (ref 4–11)

## 2018-03-22 RX ADMIN — METOPROLOL TARTRATE SCH MG: 100 TABLET, FILM COATED ORAL at 08:11

## 2018-03-22 RX ADMIN — PANTOPRAZOLE SODIUM SCH MG: 40 INJECTION, POWDER, FOR SOLUTION INTRAVENOUS at 17:07

## 2018-03-22 RX ADMIN — Medication SCH ML: at 08:11

## 2018-03-22 RX ADMIN — Medication SCH MG: at 22:28

## 2018-03-22 RX ADMIN — FAMOTIDINE SCH MG: 20 TABLET, FILM COATED ORAL at 08:11

## 2018-03-22 RX ADMIN — ACYCLOVIR SCH UNITS: 800 TABLET ORAL at 09:00

## 2018-03-22 RX ADMIN — SODIUM CHLORIDE SCH MLS/HR: 900 INJECTION, SOLUTION INTRAVENOUS at 01:15

## 2018-03-22 RX ADMIN — SODIUM CHLORIDE AND POTASSIUM CHLORIDE SCH MLS/HR: 9; 1.49 INJECTION, SOLUTION INTRAVENOUS at 22:28

## 2018-03-22 RX ADMIN — INSULIN ASPART SCH: 100 INJECTION, SOLUTION INTRAVENOUS; SUBCUTANEOUS at 12:00

## 2018-03-22 RX ADMIN — ATORVASTATIN CALCIUM SCH MG: 20 TABLET, FILM COATED ORAL at 22:29

## 2018-03-22 RX ADMIN — ISOSORBIDE MONONITRATE SCH MG: 60 TABLET, EXTENDED RELEASE ORAL at 06:03

## 2018-03-22 RX ADMIN — DONEPEZIL HYDROCHLORIDE SCH MG: 5 TABLET, FILM COATED ORAL at 22:29

## 2018-03-22 RX ADMIN — INSULIN ASPART SCH: 100 INJECTION, SOLUTION INTRAVENOUS; SUBCUTANEOUS at 17:00

## 2018-03-22 RX ADMIN — Medication SCH ML: at 22:28

## 2018-03-22 RX ADMIN — METOPROLOL TARTRATE SCH MG: 100 TABLET, FILM COATED ORAL at 22:28

## 2018-03-22 RX ADMIN — SODIUM CHLORIDE SCH MLS/HR: 900 INJECTION, SOLUTION INTRAVENOUS at 12:28

## 2018-03-22 RX ADMIN — LISINOPRIL SCH MG: 10 TABLET ORAL at 09:41

## 2018-03-22 RX ADMIN — INSULIN ASPART SCH: 100 INJECTION, SOLUTION INTRAVENOUS; SUBCUTANEOUS at 08:00

## 2018-03-22 RX ADMIN — SODIUM CHLORIDE SCH MLS/HR: 900 INJECTION, SOLUTION INTRAVENOUS at 22:27

## 2018-03-22 RX ADMIN — SODIUM CHLORIDE AND POTASSIUM CHLORIDE SCH MLS/HR: 9; 1.49 INJECTION, SOLUTION INTRAVENOUS at 09:41

## 2018-03-22 RX ADMIN — CIPROFLOXACIN SCH MLS/HR: 2 INJECTION, SOLUTION INTRAVENOUS at 22:27

## 2018-03-22 RX ADMIN — LEVOTHYROXINE SODIUM SCH MCG: 25 TABLET ORAL at 06:03

## 2018-03-22 RX ADMIN — GABAPENTIN SCH MG: 100 CAPSULE ORAL at 22:28

## 2018-03-22 RX ADMIN — INSULIN ASPART SCH: 100 INJECTION, SOLUTION INTRAVENOUS; SUBCUTANEOUS at 21:00

## 2018-03-22 RX ADMIN — ACYCLOVIR SCH UNITS: 800 TABLET ORAL at 21:00

## 2018-03-22 RX ADMIN — CIPROFLOXACIN SCH MLS/HR: 2 INJECTION, SOLUTION INTRAVENOUS at 08:10

## 2018-03-22 RX ADMIN — SODIUM CHLORIDE SCH MLS/HR: 900 INJECTION, SOLUTION INTRAVENOUS at 08:10

## 2018-03-22 RX ADMIN — DILTIAZEM HYDROCHLORIDE SCH MG: 240 CAPSULE, EXTENDED RELEASE ORAL at 08:11

## 2018-03-22 RX ADMIN — PANTOPRAZOLE SODIUM SCH MG: 40 INJECTION, POWDER, FOR SOLUTION INTRAVENOUS at 06:04

## 2018-03-22 NOTE — HHI.PR
Subjective


Remarks


Follow up for colitis. The patient is pleasantly confused, oriented to self, 

states she is at home in [place], does not known the year but states 

[employer]. She reports continued diffuse abdominal pain, unable to 

localize. She reports nausea but no vomiting. The patient reports diarrhea 

however RN reports no BMs overnight. No fevers but patient reports "a little bit

" of chills. Denies any other medical complaints at this time including no 

headache, lightheadedness, dizziness, chest pain, shortness of breath, or 

urinary complaints.





Objective


Vitals





Vital Signs








  Date Time  Temp Pulse Resp B/P (MAP) Pulse Ox O2 Delivery O2 Flow Rate FiO2


 


3/22/18 07:45 97.5 60 18 150/67 (94) 95   


 


3/22/18 04:17        


 


3/21/18 20:33 97.8 60 14 147/67 (93) 98   


 


3/21/18 16:00 97.4 60 16 125/59 (81) 92   


 


3/21/18 11:58 98.2 60 18 110/54 (72) 95   














I/O      


 


 3/21/18 3/21/18 3/21/18 3/22/18 3/22/18 3/22/18





 07:00 15:00 23:00 07:00 15:00 23:00


 


      


 


# Voids 3     


 


# Bowel Movements 2     








Result Diagram:  


3/22/18 0619                                                                   

             3/22/18 0619





Imaging





Last Impressions








Abdomen/Pelvis CT 3/20/18 0000 Signed





Impressions: 





 Service Date/Time:  Tuesday, March 20, 2018 07:49 - CONCLUSION:  1. Diffuse 





 thickening of the wall the rectum, sigmoid colon, descending colon, splenic 





 flexure and distal transverse colon suggestive of diffuse colitis. Clinical 





 correlation is recommended.  2. Choledocholithiasis without definite 





 obstruction. Correlation with alkaline phosphatase and bilirubin levels is 





 suggested.  3. 8 mm calcified stone within the left renal pelvis without 





 definite acute obstructive uropathy.  4. Mild chronic prominence of the 





 pelvicalyceal systems and ureters with diffuse thickening of the right 





 collecting system, ureter and urinary bladder wall suggesting possible chronic 





 inflammation.  5. Stable right adrenal nodule measuring 1.8 x 1.1 cm.  6. 





 Diffuse pancreatic atrophy. 7. Stable right common iliac artery aneurysm 





 measuring 2.6 cm in greatest dimension.     Tian Hummel MD 








Objective Remarks


GENERAL: Well-nourished, well-developed pleasantly confused elderly female 

patient in NAD.


SKIN: Warm and dry. No rash.


HEENT:  Normocephalic. Atraumatic.Pupils equal and round. Mucous membranes pink 

and moist.


CARDIOVASCULAR: Regular rate and rhythm. No murmur appreciated. 


RESPIRATORY: No accessory muscle use. Clear to auscultation. Breath sounds 

equal bilaterally.  


GASTROINTESTINAL: Abdomen soft, nondistended, diffuse TTP, worse throughout 

bilateral lower quadrants. Normoactive bowel sounds x4.


MUSCULOSKELETAL: No obvious deformities. Extremities without clubbing, cyanosis

, or edema. 


NEUROLOGICAL: Awake and alert. No obvious cranial nerve deficits.  Motor 

grossly within normal limits. Moving all extremities spontaneously. Normal 

speech.


PSYCHIATRIC: Appropriate mood and affect; insight and judgment limited.


Medications and IVs





Current Medications








 Medications


  (Trade)  Dose


 Ordered  Sig/George


 Route  Start Time


 Stop Time Status Last Admin


 


  (NS Flush)  2 ml  UNSCH  PRN


 IV FLUSH  3/20/18 01:30


     


 


 


  (NS Flush)  2 ml  BID


 IV FLUSH  3/20/18 09:00


    3/22/18 08:11


 


 


  (Narcan Inj)  0.4 mg  UNSCH  PRN


 IV PUSH  3/20/18 01:30


     


 


 


 Metronidazole  100 ml @ 


 100 mls/hr  Q6H


 IV  3/20/18 02:00


    3/22/18 08:10


 


 


  (Protonix Inj)  40 mg  Q12H


 IV PUSH  3/20/18 06:00


    3/22/18 06:04


 


 


 Ciprofloxacin/


 Dextrose  200 ml @ 


 200 mls/hr  Q12H


 IV  3/20/18 08:00


    3/22/18 08:10


 


 


 Potassium


 Chloride/Sodium


 Chloride  1,000 ml @ 


 84 mls/hr  [id number]


 IV  3/20/18 09:15


    3/21/18 20:57


 


 


  (Lipitor)  20 mg  HS


 PO  3/20/18 21:00


    3/21/18 20:59


 


 


  (Cardizem Cd)  240 mg  DAILY


 PO  3/20/18 09:15


    3/22/18 08:11


 


 


  (Aricept)  10 mg  HS


 PO  3/20/18 21:00


    3/21/18 21:00


 


 


  (Pepcid)  20 mg  DAILY


 PO  3/20/18 09:15


    3/22/18 08:11


 


 


  (Neurontin)  100 mg  HS


 PO  3/20/18 21:00


    3/21/18 21:00


 


 


  (Levemir Inj)  22 units  DAILY


 SQ  3/20/18 09:15


    3/21/18 11:00


 


 


  (Levemir Inj)  30 units  HS


 SQ  3/20/18 21:00


    3/21/18 21:00


 


 


  (Imdur)  60 mg  DAILY@0700


 PO  3/20/18 09:15


    3/22/18 06:03


 


 


  (Synthroid)  25 mcg  DAILY@0600


 PO  3/20/18 09:15


    3/22/18 06:03


 


 


  (Lopressor)  100 mg  BID


 PO  3/20/18 09:15


    3/22/18 08:11


 


 


  (Norco  5-325 Mg)  1 tab  Q4H  PRN


 PO  3/20/18 09:15


     


 


 


  (Morphine Inj)  1 mg  Q3H  PRN


 IV PUSH  3/20/18 09:15


     


 


 


  (D50w (Vial) Inj)  50 ml  UNSCH  PRN


 IV PUSH  3/20/18 09:15


     


 


 


  (Glucagon Inj)  1 mg  UNSCH  PRN


 OTHER  3/20/18 09:15


     


 


 


  (NovoLOG


 SUPPLEMENTAL


 SCALE)  1  ACHS SLIDING  SCALE


 SQ  3/20/18 12:00


    3/21/18 22:07


 


 


  (Pill Splitter)  1 ea  UNSCH  PRN


 OTHER  3/20/18 09:45


     


 


 


  (Slo-Niacin)  250 mg  HS


 PO  3/20/18 22:44


    3/21/18 20:59


 











A/P


Assessment and Plan


86-year-old female with history of dementia, HTN, DM, CVA x2, COPD, presents 

from nursing home with 3 days of diarrhea and 1 day of rectal bleeding





Acute Colitis: CT abd/pelvis images reviewed, shows diffuse thickening of the 

wall the rectum, sigmoid colon, descending colon, splenic flexure and distal 

transverse colon suggestive of diffuse colitis. 


   -C.difficile negative and Stool cultures negative


   -+Leukocytosis with WBC 18K, lactic acid wnl


   -Supportive treatment with IVF hydration w/NS +KCl @ 84cc/hr, antiemetics prn

, pain control prn


   -Continue antibiotics with IV Cipro and Flagyl


   -advance diet as tolerated, now NPO for procedure


   -GI Consulted, appreciate assistance





Rectal Bleeding: suspect secondary to colitis as above. 


   -Hgb stable at 13.6, continue to monitor


   -GI consulted, plan for colonoscopy vs flex sig today





Choledocholithiasis: seen on CT, without definite obstruction. No evidence of 

jaundice. AST 91, , Alk Phos 139. 


   -LFTs trending down


   -GI consulted as above





Nephrolithiasis/Urinary Retention: CT also showed 8 mm calcified stone within 

the left renal pelvis without definite acute obstructive uropathy; also mild 

chronic prominence of the pelvicalyceal systems and ureters with diffuse 

thickening of the right collecting system, ureter and urinary bladder wall 

suggesting possible chronic inflammation. 


   -monitor urine output for now


   -patient urinating spontaneously





HEIKE on CKD stage III: Cr 1.19, previously 0.83 in Aug 2016. Suspect secondary 

to dehydration with recent diarrhea and poor oral intake. 


   -give IVF with NS +KCl at 84cc/hr


   -avoid nephrotoxins


   -monitor BMP, improving, Cr 0.92 today





HTN/HLD: chronic


   -continue patient's meds including metoprolol, cardizem, statin, niacin


   -initially held lisinopril with HEIKE, will restart


   -monitor BP, adjust antihypertensives as needed





Diabetes Mellitus: chronic


   -continue patient's Levemir


   -hold patient's oral hypoglycemics including metformin


   -monitor Accu-cheks and cover with low dose SSI





Dementia: chronic


   -continue patient's Aricept


   -will need to return to nursing home at discharge





All other medical conditions stable, continue home medications as appropriate. 





DVT Prophylaxis: teds/SCDs; hold chemoprophylaxis with GI bleeding as above


Discharge Planning


Discharge pending colonoscopy today and further clinical improvement. Will need 

to return to SNF at discharge.











Rocio Valdez PA-C Mar 22, 2018 9:01 am

## 2018-03-23 VITALS
OXYGEN SATURATION: 94 % | HEART RATE: 60 BPM | RESPIRATION RATE: 16 BRPM | DIASTOLIC BLOOD PRESSURE: 56 MMHG | SYSTOLIC BLOOD PRESSURE: 130 MMHG | TEMPERATURE: 96.2 F

## 2018-03-23 VITALS
TEMPERATURE: 98 F | SYSTOLIC BLOOD PRESSURE: 130 MMHG | DIASTOLIC BLOOD PRESSURE: 66 MMHG | HEART RATE: 68 BPM | RESPIRATION RATE: 20 BRPM | OXYGEN SATURATION: 96 %

## 2018-03-23 VITALS
RESPIRATION RATE: 16 BRPM | TEMPERATURE: 97.3 F | DIASTOLIC BLOOD PRESSURE: 66 MMHG | HEART RATE: 62 BPM | OXYGEN SATURATION: 94 % | SYSTOLIC BLOOD PRESSURE: 142 MMHG

## 2018-03-23 RX ADMIN — Medication SCH ML: at 09:52

## 2018-03-23 RX ADMIN — SODIUM CHLORIDE AND POTASSIUM CHLORIDE SCH MLS/HR: 9; 1.49 INJECTION, SOLUTION INTRAVENOUS at 09:53

## 2018-03-23 RX ADMIN — ISOSORBIDE MONONITRATE SCH MG: 60 TABLET, EXTENDED RELEASE ORAL at 07:37

## 2018-03-23 RX ADMIN — Medication SCH ML: at 21:00

## 2018-03-23 RX ADMIN — METOPROLOL TARTRATE SCH MG: 100 TABLET, FILM COATED ORAL at 07:36

## 2018-03-23 RX ADMIN — ACYCLOVIR SCH UNITS: 800 TABLET ORAL at 07:48

## 2018-03-23 RX ADMIN — LEVOTHYROXINE SODIUM SCH MCG: 25 TABLET ORAL at 06:00

## 2018-03-23 RX ADMIN — CIPROFLOXACIN SCH MLS/HR: 2 INJECTION, SOLUTION INTRAVENOUS at 07:37

## 2018-03-23 RX ADMIN — PANTOPRAZOLE SODIUM SCH MG: 40 INJECTION, POWDER, FOR SOLUTION INTRAVENOUS at 06:26

## 2018-03-23 RX ADMIN — ACYCLOVIR SCH UNITS: 800 TABLET ORAL at 21:00

## 2018-03-23 RX ADMIN — LISINOPRIL SCH MG: 10 TABLET ORAL at 07:36

## 2018-03-23 RX ADMIN — DILTIAZEM HYDROCHLORIDE SCH MG: 240 CAPSULE, EXTENDED RELEASE ORAL at 07:37

## 2018-03-23 RX ADMIN — SODIUM CHLORIDE SCH MLS/HR: 900 INJECTION, SOLUTION INTRAVENOUS at 15:02

## 2018-03-23 RX ADMIN — INSULIN ASPART SCH: 100 INJECTION, SOLUTION INTRAVENOUS; SUBCUTANEOUS at 07:37

## 2018-03-23 RX ADMIN — INSULIN ASPART SCH: 100 INJECTION, SOLUTION INTRAVENOUS; SUBCUTANEOUS at 08:00

## 2018-03-23 RX ADMIN — SODIUM CHLORIDE SCH MLS/HR: 900 INJECTION, SOLUTION INTRAVENOUS at 03:10

## 2018-03-23 RX ADMIN — SODIUM CHLORIDE SCH MLS/HR: 900 INJECTION, SOLUTION INTRAVENOUS at 07:37

## 2018-03-23 RX ADMIN — INSULIN ASPART SCH: 100 INJECTION, SOLUTION INTRAVENOUS; SUBCUTANEOUS at 21:00

## 2018-03-23 RX ADMIN — PANTOPRAZOLE SODIUM SCH MG: 40 INJECTION, POWDER, FOR SOLUTION INTRAVENOUS at 17:45

## 2018-03-23 RX ADMIN — INSULIN ASPART SCH: 100 INJECTION, SOLUTION INTRAVENOUS; SUBCUTANEOUS at 17:00

## 2018-03-23 RX ADMIN — FAMOTIDINE SCH MG: 20 TABLET, FILM COATED ORAL at 07:37

## 2018-03-23 NOTE — GIPROC
[facility]

[address] [facility]. [facility], [address]

 

 

FLEXIBLE SIGMOIDOSCOPY PROCEDURE REPORT     EXAM DATE: 03/23/2018

 

 

PATIENT NAME:      Erin Nails           MR #:      [id number]

YOB: 1932      ORDER #:     [id number]

ATTENDING:     Jolie Gonzalez MD     VISIT #:     [id number]

ASSISTANT:      Olivia Rizzo and Hazel Chaidez     STATUS:     inpatient

 

 

INDICATIONS:  The patient is a 86 yr old female here for a flexible

sigmoidoscopy due to bleeding rectal, colitis

PROCEDURE PERFORMED:     Flexible Sigmoidoscopy with biopsy

MEDICATIONS:     None and Per Anesthesia.

ESTIMATED BLOOD LOSS:     None

 

CONSENT: The patient understands the risks and benefits of the procedure and

understands that these risks include, but are not limited to: sedation,

allergic reaction, infection, perforation and/or bleeding. Alternative means of

evaluation and treatment include, among others: physical exam, x-rays, and/or

surgical intervention. The patient elects to proceed with this endoscopic

procedure.

 



medical equipment was checked for proper function. Hand hygiene and appropriate

measures for infection prevention was taken. After the risks, benefits and

alternatives of the procedure were thoroughly explained, Informed consent was

verified, confirmed and timeout was successfully executed by the treatment

team. A digital rectal exam  revealed external hemorrhoids   The Pentax

[id number] endoscope was introduced through the anus and advanced to the splenic

flexure. The prep was poor. The instrument was then slowly withdrawn as the

colon was fully examined.

 

 

COLON FINDINGS: Diverticulosis sigmoid,descending

no colitis noted-random biopsies from descending, sigmoid, rectum. Retroflexed

views revealed internal hemorrhoid  The scope was then completely withdrawn

from the patient and the procedure terminated.

 

 

 

ADVERSE EVENTS:      There were no complications.

IMPRESSIONS:     1.  Diverticulosis sigmoid,descending

no colitis noted-random biopsies from descending, sigmoid, rectum

2.  Retroflexed views revealed internal hemorrhoid

3.  Revealed external hemorrhoids

 

RECOMMENDATIONS:     1.  Await biopsy results

2.  /d/c flagy

ok to dc from [facility]

[facility] in 2 weeks

gi will sign off

RECALL:     Return 3 months Colonoscopy

 

 

 

_____________________________

Jolie Gonzalez MD

eSigned:  Jolie Gonzalez MD 03/23/2018 2:29 PM

 

 

cc:

## 2018-03-23 NOTE — HHI.PR
Subjective


Remarks


Follow up for colitis. RN at bedside, reports one episode of diarrhea this 

morning after enema given. No reported bowel movement yesterday. She denies any 

nausea or vomiting. Denies fevers/chills. Abdominal pain improving. She 

tolerated oral intake yesterday. No other medical complaints at this time.





Objective


Vitals





Vital Signs








  Date Time  Temp Pulse Resp B/P (MAP) Pulse Ox O2 Delivery O2 Flow Rate FiO2


 


3/23/18 08:13 98.0 68 20 130/66 (87) 96   


 


3/23/18 02:52 97.3 62 16 142/66 (91) 94   


 


3/22/18 20:07 97.4 60 20 139/62 (87) 95   


 


3/22/18 16:02  60 20 145/67 (93) 96   














I/O      


 


 3/22/18 3/22/18 3/22/18 3/23/18 3/23/18 3/23/18





 07:00 15:00 23:00 07:00 15:00 23:00


 


Intake Total  1350 ml 1300 ml 200 ml 1200 ml 


 


Balance  1350 ml 1300 ml 200 ml 1200 ml 


 


      


 


Intake Oral   500 ml 200 ml 200 ml 


 


IV Total  1350 ml 800 ml  1000 ml 








Result Diagram:  


3/22/18 0619                                                                   

             3/22/18 0619





Imaging





Last Impressions








Abdomen/Pelvis CT 3/20/18 0000 Signed





Impressions: 





 Service Date/Time:  Tuesday, March 20, 2018 07:49 - CONCLUSION:  1. Diffuse 





 thickening of the wall the rectum, sigmoid colon, descending colon, splenic 





 flexure and distal transverse colon suggestive of diffuse colitis. Clinical 





 correlation is recommended.  2. Choledocholithiasis without definite 





 obstruction. Correlation with alkaline phosphatase and bilirubin levels is 





 suggested.  3. 8 mm calcified stone within the left renal pelvis without 





 definite acute obstructive uropathy.  4. Mild chronic prominence of the 





 pelvicalyceal systems and ureters with diffuse thickening of the right 





 collecting system, ureter and urinary bladder wall suggesting possible chronic 





 inflammation.  5. Stable right adrenal nodule measuring 1.8 x 1.1 cm.  6. 





 Diffuse pancreatic atrophy. 7. Stable right common iliac artery aneurysm 





 measuring 2.6 cm in greatest dimension.     Tian Hummel MD 








Objective Remarks


GENERAL: Well-nourished, well-developed pleasantly confused elderly female 

patient in NAD.


SKIN: Warm and dry. No rash.


HEENT:  Normocephalic. Atraumatic.Pupils equal and round. Mucous membranes pink 

and moist.


CARDIOVASCULAR: Regular rate and rhythm. No murmur appreciated. 


RESPIRATORY: No accessory muscle use. Clear to auscultation. Breath sounds 

equal bilaterally.  


GASTROINTESTINAL: Abdomen soft, nondistended, minimal TTP at bilateral lower 

quadrants, improved compared to previous exams. Normoactive bowel sounds x4.


MUSCULOSKELETAL: No obvious deformities. Extremities without clubbing, cyanosis

, or edema. 


NEUROLOGICAL: Awake and alert. No obvious cranial nerve deficits.  Motor 

grossly within normal limits. Moving all extremities spontaneously. Normal 

speech.


PSYCHIATRIC: Appropriate mood and affect; insight and judgment limited.


Medications and IVs





Current Medications








 Medications


  (Trade)  Dose


 Ordered  Sig/George


 Route  Start Time


 Stop Time Status Last Admin


 


  (NS Flush)  2 ml  UNSCH  PRN


 IV FLUSH  3/20/18 01:30


     


 


 


  (NS Flush)  2 ml  BID


 IV FLUSH  3/20/18 09:00


    3/23/18 09:52


 


 


  (Narcan Inj)  0.4 mg  UNSCH  PRN


 IV PUSH  3/20/18 01:30


     


 


 


 Metronidazole  100 ml @ 


 100 mls/hr  Q6H


 IV  3/20/18 02:00


    3/23/18 07:37


 


 


  (Protonix Inj)  40 mg  Q12H


 IV PUSH  3/20/18 06:00


    3/23/18 06:26


 


 


 Ciprofloxacin/


 Dextrose  200 ml @ 


 200 mls/hr  Q12H


 IV  3/20/18 08:00


    3/23/18 07:37


 


 


 Potassium


 Chloride/Sodium


 Chloride  1,000 ml @ 


 84 mls/hr  [id number]


 IV  3/20/18 09:15


    3/23/18 09:53


 


 


  (Lipitor)  20 mg  HS


 PO  3/20/18 21:00


    3/22/18 22:29


 


 


  (Cardizem Cd)  240 mg  DAILY


 PO  3/20/18 09:15


    3/23/18 07:37


 


 


  (Aricept)  10 mg  HS


 PO  3/20/18 21:00


    3/22/18 22:29


 


 


  (Pepcid)  20 mg  DAILY


 PO  3/20/18 09:15


    3/23/18 07:37


 


 


  (Neurontin)  100 mg  HS


 PO  3/20/18 21:00


    3/22/18 22:28


 


 


  (Levemir Inj)  22 units  DAILY


 SQ  3/20/18 09:15


    3/22/18 09:00


 


 


  (Levemir Inj)  30 units  HS


 SQ  3/20/18 21:00


    3/21/18 21:00


 


 


  (Imdur)  60 mg  DAILY@0700


 PO  3/20/18 09:15


    3/23/18 07:37


 


 


  (Synthroid)  25 mcg  DAILY@0600


 PO  3/20/18 09:15


    3/22/18 06:03


 


 


  (Lopressor)  100 mg  BID


 PO  3/20/18 09:15


    3/23/18 07:36


 


 


  (Norco  5-325 Mg)  1 tab  Q4H  PRN


 PO  3/20/18 09:15


     


 


 


  (Morphine Inj)  1 mg  Q3H  PRN


 IV PUSH  3/20/18 09:15


     


 


 


  (D50w (Vial) Inj)  50 ml  UNSCH  PRN


 IV PUSH  3/20/18 09:15


     


 


 


  (Glucagon Inj)  1 mg  UNSCH  PRN


 OTHER  3/20/18 09:15


     


 


 


  (NovoLOG


 SUPPLEMENTAL


 SCALE)  1  ACHS SLIDING  SCALE


 SQ  3/20/18 12:00


    3/22/18 08:00


 


 


  (Pill Splitter)  1 ea  UNSCH  PRN


 OTHER  3/20/18 09:45


     


 


 


  (Slo-Niacin)  250 mg  HS


 PO  3/20/18 22:44


    3/22/18 22:28


 


 


  (Prinivil)  10 mg  DAILY


 PO  3/22/18 10:00


    3/23/18 07:36


 


 


 Lactated Ringer's  1,000 ml @ 


 30 mls/hr  Q24H PRN


 IV  3/23/18 13:30


 3/26/18 13:29   


 


 


 Sodium Chloride  500 ml @ 


 30 mls/hr  [id number] PRN


 IV  3/23/18 13:30


 3/26/18 13:29   


 


 


  (Lopressor)  25 mg  ON CALL  PRN


 PO  3/23/18 13:30


 3/26/18 13:29   


 


 


  (Betadine 5%


 Antisepsis Kit)  1 applic  ON CALL  PRN


 EACH NARE  3/23/18 13:30


 3/26/18 13:29   


 


 


  (Chlorhexidine


 2% Cloth)  3 pack  ON CALL  PRN


 TOPICAL  3/23/18 13:30


 3/26/18 13:29   


 


 


  (NovoLIN R INJ)  See


 Protocol


 Table ...  ON CALL  PRN


 SQ  3/23/18 13:30


 3/26/18 13:29   


 











A/P


Assessment and Plan


86-year-old female with history of dementia, HTN, DM, CVA x2, COPD, presents 

from nursing home with 3 days of diarrhea and 1 day of rectal bleeding





Acute Colitis: CT abd/pelvis images reviewed, shows diffuse thickening of the 

wall the rectum, sigmoid colon, descending colon, splenic flexure and distal 

transverse colon suggestive of diffuse colitis. 


   -C.difficile negative and Stool cultures negative


   -+Leukocytosis with WBC 18K, lactic acid wnl


   -Supportive treatment with IVF hydration w/NS +KCl @ 84cc/hr, antiemetics prn

, pain control prn


   -Continue antibiotics with IV Cipro and Flagyl


   -advance diet as tolerated, now NPO for procedure


   -GI Consulted, appreciate assistance





Rectal Bleeding: suspect secondary to colitis as above. 


   -Hgb stable at 13.6, continue to monitor


   -GI consulted, plan for colonoscopy vs flex sig today





Choledocholithiasis: seen on CT, without definite obstruction. No evidence of 

jaundice. AST 91, , Alk Phos 139. 


   -LFTs trending down


   -GI consulted as above





Nephrolithiasis/Urinary Retention: CT also showed 8 mm calcified stone within 

the left renal pelvis without definite acute obstructive uropathy; also mild 

chronic prominence of the pelvicalyceal systems and ureters with diffuse 

thickening of the right collecting system, ureter and urinary bladder wall 

suggesting possible chronic inflammation. 


   -monitor urine output for now


   -patient urinating spontaneously





HEIKE on CKD stage III: Cr 1.19, previously 0.83 in Aug 2016. Suspect secondary 

to dehydration with recent diarrhea and poor oral intake. 


   -give IVF with NS +KCl at 84cc/hr


   -avoid nephrotoxins


   -monitor BMP, improving, Cr 0.92 today





HTN/HLD: chronic


   -continue patient's meds including metoprolol, cardizem, statin, niacin


   -initially held lisinopril with HEIKE, now restarted


   -monitor BP, adjust antihypertensives as needed





Diabetes Mellitus: chronic


   -continue patient's Levemir


   -hold patient's oral hypoglycemics including metformin


   -monitor Accu-cheks and cover with low dose SSI





Dementia: chronic


   -continue patient's Aricept


   -will need to return to nursing home at discharge





All other medical conditions stable, continue home medications as appropriate. 





DVT Prophylaxis: teds/SCDs; hold chemoprophylaxis with GI bleeding as above


Discharge Planning


Discharge pending colonoscopy today and further clinical improvement. Will need 

to return to SNF at discharge.











Rocio Valdez PA-C Mar 23, 2018 11:45 am

## 2018-03-23 NOTE — HHI.GIFU
Subjective


Remarks


Pt sleeping at time of my exam


She opens her eyes but is very drowsy so minimal responses


No complaints at this time 


 (Massiel Caballero)





Objective


Vitals I&O





Vital Signs








  Date Time  Temp Pulse Resp B/P (MAP) Pulse Ox O2 Delivery O2 Flow Rate FiO2


 


3/23/18 08:13 98.0 68 20 130/66 (87) 96   


 


3/23/18 02:52 97.3 62 16 142/66 (91) 94   


 


3/22/18 20:07 97.4 60 20 139/62 (87) 95   


 


3/22/18 16:02  60 20 145/67 (93) 96   


 


3/22/18 11:20 97.5 60 20 127/62 (83) 95   














I/O      


 


 3/22/18 3/22/18 3/22/18 3/23/18 3/23/18 3/23/18





 07:00 15:00 23:00 07:00 15:00 23:00


 


Intake Total  1350 ml 1300 ml 200 ml 200 ml 


 


Balance  1350 ml 1300 ml 200 ml 200 ml 


 


      


 


Intake Oral   500 ml 200 ml 200 ml 


 


IV Total  1350 ml 800 ml   








Laboratory











 Date/Time


Source Procedure


Growth Status


 


 


 3/21/18 09:00


Stool Stool - Final


NO ENTERIC PATHOGENS DETECTED BY PCR... Complete








Imaging





Last Impressions








Abdomen/Pelvis CT 3/20/18 0000 Signed





Impressions: 





 Service Date/Time:  Tuesday, March 20, 2018 07:49 - CONCLUSION:  1. Diffuse 





 thickening of the wall the rectum, sigmoid colon, descending colon, splenic 





 flexure and distal transverse colon suggestive of diffuse colitis. Clinical 





 correlation is recommended.  2. Choledocholithiasis without definite 





 obstruction. Correlation with alkaline phosphatase and bilirubin levels is 





 suggested.  3. 8 mm calcified stone within the left renal pelvis without 





 definite acute obstructive uropathy.  4. Mild chronic prominence of the 





 pelvicalyceal systems and ureters with diffuse thickening of the right 





 collecting system, ureter and urinary bladder wall suggesting possible chronic 





 inflammation.  5. Stable right adrenal nodule measuring 1.8 x 1.1 cm.  6. 





 Diffuse pancreatic atrophy. 7. Stable right common iliac artery aneurysm 





 measuring 2.6 cm in greatest dimension.     Tian Hummel MD 








Physical Exam


HEENT:Normocephalic; atraumatic


CHEST: Even/unlabored 


CARDIAC:  RRR


ABDOMEN:  Soft, nondistended, nontender; bowel sounds active 


EXTREMITIES: No clubbing, cyanosis, or edema.


SKIN:  Normal; no rash; no jaundice.


CNS:  Drowsy 


 (Massiel Caballero)





Assessment and Plan


Plan


ASSESSMENT


- CT findings suggestive of colitis- Diffuse thickening of wall the rectum, 

sigmoid colon,


  descending colon, splenic flexure and distal transverse colon suggestive of 

diffuse colitis.


  C. Diff negative.  Stool enteric pathogens pending. Leukocytosis improving. 

Pt on Cipro


  and Flagyl.  


- Reports of rectal bleeding from rehab- H/H stable on admission- some drop 

today but


  remains stable WNL.  No reports of further rectal bleeding. 


- CT findings suggestive of choledocholithiasis- Alk phos now WNL.  LFTs 

trending down. 


  B bili WNL.  





(3/23) --> Pt sleeping and drowsy during my exam.  Does open her eyes with 

minimal


  responses.  No BM documented from yesterday.  Enteric pathogens negative. 

Would


  like to do sigmoidoscopy today if able to get consent.  Tried calling 

daughter Aury in


  [place], left message will try again later.  Other daughter Jelena lives in [place] 

according to


  chart and it is too early to contact her 


  Labs from today pending 


  **Consent obtained.  Per RN pt already had soap suds enema this AM and thinks 

2


  large BMs after 





PLAN


- Flex sigmoidoscopy today


- Soap suds enema x 2


- Obtain consent 


- Keep pt NPO


- Monitor LFTs 


- Monitor H/H


- Notify GI of active bleeding


- Flagyl


- Cipro


- CASPER


- Colonoscopy if further bleeding or drop in H/H


- Further recommendations based on findings of above and clinical course





Pt has been seen and examined by myself and Dr. Gonzalez and this note is written 

on her behalf 





 (Massiel Caballero)


Physician Comments


seen, examined


agree with above


flexisigmoidoscopy today


mrcp -discussed with daughter if cbd stone , may consider ercp-if stone not 

passed , will rediscuss after mrcp


 (Jolie Gonzalez MD)











Massiel Caballero Mar 23, 2018 09:01


Jolie Gonzalez MD Mar 23, 2018 15:19

## 2018-03-23 NOTE — EKG
Date Performed: 03/23/2018       Time Performed: 01:17:42

 

PTAGE:      86 years

 

EKG:      ELECTRONIC ATRIAL PACEMAKER Since previous tracing, no significant change noted ABNORMAL RH
[facility] ECG

 

PREVIOUS TRACING       :   06/29/2016    13.45.06

 

DOCTOR:   Major Naidu  Interpretating Date/Time  03/23/2018 20:19:40

## 2018-03-24 VITALS
HEART RATE: 60 BPM | TEMPERATURE: 97.4 F | DIASTOLIC BLOOD PRESSURE: 67 MMHG | SYSTOLIC BLOOD PRESSURE: 135 MMHG | RESPIRATION RATE: 16 BRPM | OXYGEN SATURATION: 97 %

## 2018-03-24 VITALS
SYSTOLIC BLOOD PRESSURE: 115 MMHG | DIASTOLIC BLOOD PRESSURE: 56 MMHG | RESPIRATION RATE: 12 BRPM | TEMPERATURE: 97.6 F | HEART RATE: 60 BPM | OXYGEN SATURATION: 95 %

## 2018-03-24 VITALS
SYSTOLIC BLOOD PRESSURE: 132 MMHG | TEMPERATURE: 97.9 F | DIASTOLIC BLOOD PRESSURE: 60 MMHG | OXYGEN SATURATION: 97 % | HEART RATE: 60 BPM | RESPIRATION RATE: 18 BRPM

## 2018-03-24 VITALS
HEART RATE: 60 BPM | TEMPERATURE: 97.9 F | DIASTOLIC BLOOD PRESSURE: 88 MMHG | OXYGEN SATURATION: 95 % | SYSTOLIC BLOOD PRESSURE: 146 MMHG | RESPIRATION RATE: 18 BRPM

## 2018-03-24 VITALS
RESPIRATION RATE: 16 BRPM | OXYGEN SATURATION: 95 % | HEART RATE: 60 BPM | TEMPERATURE: 97.6 F | SYSTOLIC BLOOD PRESSURE: 120 MMHG | DIASTOLIC BLOOD PRESSURE: 57 MMHG

## 2018-03-24 VITALS
OXYGEN SATURATION: 98 % | HEART RATE: 65 BPM | DIASTOLIC BLOOD PRESSURE: 60 MMHG | RESPIRATION RATE: 18 BRPM | TEMPERATURE: 98 F | SYSTOLIC BLOOD PRESSURE: 132 MMHG

## 2018-03-24 LAB
ALBUMIN SERPL-MCNC: 2.1 GM/DL (ref 3.4–5)
ALP SERPL-CCNC: 196 U/L (ref 45–117)
ALT SERPL-CCNC: 44 U/L (ref 10–53)
AST SERPL-CCNC: 78 U/L (ref 15–37)
BILIRUB SERPL-MCNC: 0.3 MG/DL (ref 0.2–1)
BUN SERPL-MCNC: 4 MG/DL (ref 7–18)
CALCIUM SERPL-MCNC: 7.9 MG/DL (ref 8.5–10.1)
CHLORIDE SERPL-SCNC: 109 MEQ/L (ref 98–107)
CREAT SERPL-MCNC: 0.56 MG/DL (ref 0.5–1)
GFR SERPLBLD BASED ON 1.73 SQ M-ARVRAT: 103 ML/MIN (ref 89–?)
GLUCOSE SERPL-MCNC: 110 MG/DL (ref 74–106)
HCO3 BLD-SCNC: 24.8 MEQ/L (ref 21–32)
PROT SERPL-MCNC: 6.1 GM/DL (ref 6.4–8.2)
SODIUM SERPL-SCNC: 143 MEQ/L (ref 136–145)

## 2018-03-24 RX ADMIN — INSULIN ASPART SCH: 100 INJECTION, SOLUTION INTRAVENOUS; SUBCUTANEOUS at 14:15

## 2018-03-24 RX ADMIN — ATORVASTATIN CALCIUM SCH MG: 20 TABLET, FILM COATED ORAL at 22:08

## 2018-03-24 RX ADMIN — INSULIN ASPART SCH: 100 INJECTION, SOLUTION INTRAVENOUS; SUBCUTANEOUS at 22:43

## 2018-03-24 RX ADMIN — ACYCLOVIR SCH UNITS: 800 TABLET ORAL at 09:28

## 2018-03-24 RX ADMIN — ACYCLOVIR SCH UNITS: 800 TABLET ORAL at 22:43

## 2018-03-24 RX ADMIN — GABAPENTIN SCH MG: 100 CAPSULE ORAL at 00:18

## 2018-03-24 RX ADMIN — SODIUM CHLORIDE SCH MLS/HR: 900 INJECTION, SOLUTION INTRAVENOUS at 23:42

## 2018-03-24 RX ADMIN — METOPROLOL TARTRATE SCH MG: 100 TABLET, FILM COATED ORAL at 22:18

## 2018-03-24 RX ADMIN — SODIUM CHLORIDE SCH MLS/HR: 900 INJECTION, SOLUTION INTRAVENOUS at 14:15

## 2018-03-24 RX ADMIN — SODIUM CHLORIDE AND POTASSIUM CHLORIDE SCH MLS/HR: 9; 1.49 INJECTION, SOLUTION INTRAVENOUS at 09:21

## 2018-03-24 RX ADMIN — METOPROLOL TARTRATE SCH MG: 100 TABLET, FILM COATED ORAL at 00:17

## 2018-03-24 RX ADMIN — CIPROFLOXACIN SCH MLS/HR: 2 INJECTION, SOLUTION INTRAVENOUS at 00:17

## 2018-03-24 RX ADMIN — CIPROFLOXACIN SCH MLS/HR: 2 INJECTION, SOLUTION INTRAVENOUS at 09:28

## 2018-03-24 RX ADMIN — LISINOPRIL SCH MG: 10 TABLET ORAL at 09:28

## 2018-03-24 RX ADMIN — Medication SCH ML: at 22:08

## 2018-03-24 RX ADMIN — GABAPENTIN SCH MG: 100 CAPSULE ORAL at 22:19

## 2018-03-24 RX ADMIN — GABAPENTIN SCH MG: 100 CAPSULE ORAL at 22:08

## 2018-03-24 RX ADMIN — DILTIAZEM HYDROCHLORIDE SCH MG: 240 CAPSULE, EXTENDED RELEASE ORAL at 09:28

## 2018-03-24 RX ADMIN — SODIUM CHLORIDE SCH MLS/HR: 900 INJECTION, SOLUTION INTRAVENOUS at 00:16

## 2018-03-24 RX ADMIN — Medication SCH MG: at 22:19

## 2018-03-24 RX ADMIN — METOPROLOL TARTRATE SCH MG: 100 TABLET, FILM COATED ORAL at 09:29

## 2018-03-24 RX ADMIN — FAMOTIDINE SCH MG: 20 TABLET, FILM COATED ORAL at 09:29

## 2018-03-24 RX ADMIN — ISOSORBIDE MONONITRATE SCH MG: 60 TABLET, EXTENDED RELEASE ORAL at 09:28

## 2018-03-24 RX ADMIN — SODIUM CHLORIDE SCH MLS/HR: 900 INJECTION, SOLUTION INTRAVENOUS at 09:23

## 2018-03-24 RX ADMIN — ATORVASTATIN CALCIUM SCH MG: 20 TABLET, FILM COATED ORAL at 22:18

## 2018-03-24 RX ADMIN — CIPROFLOXACIN SCH MLS/HR: 2 INJECTION, SOLUTION INTRAVENOUS at 22:08

## 2018-03-24 RX ADMIN — SODIUM CHLORIDE SCH MLS/HR: 900 INJECTION, SOLUTION INTRAVENOUS at 03:39

## 2018-03-24 RX ADMIN — ATORVASTATIN CALCIUM SCH MG: 20 TABLET, FILM COATED ORAL at 00:18

## 2018-03-24 RX ADMIN — DONEPEZIL HYDROCHLORIDE SCH MG: 5 TABLET, FILM COATED ORAL at 00:18

## 2018-03-24 RX ADMIN — INSULIN ASPART SCH: 100 INJECTION, SOLUTION INTRAVENOUS; SUBCUTANEOUS at 08:18

## 2018-03-24 RX ADMIN — DONEPEZIL HYDROCHLORIDE SCH MG: 5 TABLET, FILM COATED ORAL at 22:18

## 2018-03-24 RX ADMIN — PANTOPRAZOLE SODIUM SCH MG: 40 INJECTION, POWDER, FOR SOLUTION INTRAVENOUS at 06:02

## 2018-03-24 RX ADMIN — INSULIN ASPART SCH: 100 INJECTION, SOLUTION INTRAVENOUS; SUBCUTANEOUS at 17:51

## 2018-03-24 RX ADMIN — Medication SCH ML: at 09:25

## 2018-03-24 RX ADMIN — Medication SCH MG: at 00:18

## 2018-03-24 RX ADMIN — Medication SCH MG: at 22:08

## 2018-03-24 RX ADMIN — DONEPEZIL HYDROCHLORIDE SCH MG: 5 TABLET, FILM COATED ORAL at 22:08

## 2018-03-24 RX ADMIN — SODIUM CHLORIDE AND POTASSIUM CHLORIDE SCH MLS/HR: 9; 1.49 INJECTION, SOLUTION INTRAVENOUS at 00:18

## 2018-03-24 RX ADMIN — LEVOTHYROXINE SODIUM SCH MCG: 25 TABLET ORAL at 06:02

## 2018-03-24 RX ADMIN — METOPROLOL TARTRATE SCH MG: 100 TABLET, FILM COATED ORAL at 22:08

## 2018-03-24 RX ADMIN — PANTOPRAZOLE SODIUM SCH MG: 40 INJECTION, POWDER, FOR SOLUTION INTRAVENOUS at 17:51

## 2018-03-24 NOTE — HHI.PR
Subjective


Remarks


Follow up for colitis, rectal bleeding, abdominal pain, choledocholithiasis. 

The patient is seen resting in bed, oriented to person, hospital, and president 

[place] only, not date. She denies any abdominal pain today. Continues to deny 

any nausea/vomiting. Last BM reported yesterday after enemas. No fevers/chills. 

She states the only pain she has are hunger pains and she wants to eat.





Objective


Vitals





Vital Signs








  Date Time  Temp Pulse Resp B/P (MAP) Pulse Ox O2 Delivery O2 Flow Rate FiO2


 


3/24/18 07:59 97.4 60 16 135/67 (89) 97   


 


3/24/18 00:00 97.9 60 18 146/88 (107) 95   


 


3/23/18 21:57 96.2 60 16 130/56 (80) 94   


 


3/23/18 14:40 96.5 60 20 126/56 (79) 96   














I/O      


 


 3/23/18 3/23/18 3/23/18 3/24/18 3/24/18 3/24/18





 07:00 15:00 23:00 07:00 15:00 23:00


 


Intake Total 200 ml 1500 ml 650 ml 200 ml  


 


Balance 200 ml 1500 ml 650 ml 200 ml  


 


      


 


Intake Oral 200 ml 200 ml 250 ml 200 ml  


 


IV Total  1200 ml 400 ml   


 


Other  100 ml    








Result Diagram:  


3/22/18 0619                                                                   

             3/22/18 0619





Imaging





Last Impressions








Abdomen/Pelvis CT 3/20/18 0000 Signed





Impressions: 





 Service Date/Time:  Tuesday, March 20, 2018 07:49 - CONCLUSION:  1. Diffuse 





 thickening of the wall the rectum, sigmoid colon, descending colon, splenic 





 flexure and distal transverse colon suggestive of diffuse colitis. Clinical 





 correlation is recommended.  2. Choledocholithiasis without definite 





 obstruction. Correlation with alkaline phosphatase and bilirubin levels is 





 suggested.  3. 8 mm calcified stone within the left renal pelvis without 





 definite acute obstructive uropathy.  4. Mild chronic prominence of the 





 pelvicalyceal systems and ureters with diffuse thickening of the right 





 collecting system, ureter and urinary bladder wall suggesting possible chronic 





 inflammation.  5. Stable right adrenal nodule measuring 1.8 x 1.1 cm.  6. 





 Diffuse pancreatic atrophy. 7. Stable right common iliac artery aneurysm 





 measuring 2.6 cm in greatest dimension.     Tian Hummel MD 








Objective Remarks


GENERAL: Well-nourished, well-developed pleasantly confused elderly female 

patient in NAD.


SKIN: Warm and dry. No rash.


HEENT:  Normocephalic. Atraumatic.Pupils equal and round. Mucous membranes pink 

and moist.


CARDIOVASCULAR: Regular rate and rhythm. No murmur appreciated. 


RESPIRATORY: No accessory muscle use. Clear to auscultation. Breath sounds 

equal bilaterally.  


GASTROINTESTINAL: Abdomen soft, nondistended, minimal TTP at bilateral lower 

quadrants, improved compared to previous exams. Normoactive bowel sounds x4.


MUSCULOSKELETAL: No obvious deformities. Extremities without clubbing, cyanosis

, or edema. 


NEUROLOGICAL: Awake and alert. No obvious cranial nerve deficits.  Motor 

grossly within normal limits. Moving all extremities spontaneously. Normal 

speech.


PSYCHIATRIC: Appropriate mood and affect; insight and judgment limited.


Procedures


3/23 - Flex Sigmoidoscopy by Dr. Gonzalez - Showed Diverticulosis sigmoid,

descending. No colitis noted-random biopsies from descending, sigmoid, rectum.  

Retroflexed views revealed internal hemorrhoid. Revealed external hemorrhoids.


Medications and IVs





Current Medications








 Medications


  (Trade)  Dose


 Ordered  Sig/George


 Route  Start Time


 Stop Time Status Last Admin


 


  (NS Flush)  2 ml  UNSCH  PRN


 IV FLUSH  3/20/18 01:30


     


 


 


  (NS Flush)  2 ml  BID


 IV FLUSH  3/20/18 09:00


    3/23/18 09:52


 


 


  (Narcan Inj)  0.4 mg  UNSCH  PRN


 IV PUSH  3/20/18 01:30


     


 


 


 Metronidazole  100 ml @ 


 100 mls/hr  Q6H


 IV  3/20/18 02:00


    3/24/18 03:39


 


 


  (Protonix Inj)  40 mg  Q12H


 IV PUSH  3/20/18 06:00


    3/24/18 06:02


 


 


 Ciprofloxacin/


 Dextrose  200 ml @ 


 200 mls/hr  Q12H


 IV  3/20/18 08:00


    3/24/18 00:17


 


 


 Potassium


 Chloride/Sodium


 Chloride  1,000 ml @ 


 84 mls/hr  [id number]


 IV  3/20/18 09:15


    3/24/18 00:18


 


 


  (Lipitor)  20 mg  HS


 PO  3/20/18 21:00


    3/24/18 00:18


 


 


  (Cardizem Cd)  240 mg  DAILY


 PO  3/20/18 09:15


    3/23/18 07:37


 


 


  (Aricept)  10 mg  HS


 PO  3/20/18 21:00


    3/24/18 00:18


 


 


  (Pepcid)  20 mg  DAILY


 PO  3/20/18 09:15


    3/23/18 07:37


 


 


  (Neurontin)  100 mg  HS


 PO  3/20/18 21:00


    3/24/18 00:18


 


 


  (Levemir Inj)  22 units  DAILY


 SQ  3/20/18 09:15


    3/22/18 09:00


 


 


  (Levemir Inj)  30 units  HS


 SQ  3/20/18 21:00


    3/21/18 21:00


 


 


  (Imdur)  60 mg  DAILY@0700


 PO  3/20/18 09:15


    3/23/18 07:37


 


 


  (Synthroid)  25 mcg  DAILY@0600


 PO  3/20/18 09:15


    3/24/18 06:02


 


 


  (Lopressor)  100 mg  BID


 PO  3/20/18 09:15


    3/24/18 00:17


 


 


  (Norco  5-325 Mg)  1 tab  Q4H  PRN


 PO  3/20/18 09:15


     


 


 


  (Morphine Inj)  1 mg  Q3H  PRN


 IV PUSH  3/20/18 09:15


     


 


 


  (D50w (Vial) Inj)  50 ml  UNSCH  PRN


 IV PUSH  3/20/18 09:15


     


 


 


  (Glucagon Inj)  1 mg  UNSCH  PRN


 OTHER  3/20/18 09:15


     


 


 


  (NovoLOG


 SUPPLEMENTAL


 SCALE)  1  ACHS SLIDING  SCALE


 SQ  3/20/18 12:00


    3/22/18 08:00


 


 


  (Pill Splitter)  1 ea  UNSCH  PRN


 OTHER  3/20/18 09:45


     


 


 


  (Slo-Niacin)  250 mg  HS


 PO  3/20/18 22:44


    3/24/18 00:18


 


 


  (Prinivil)  10 mg  DAILY


 PO  3/22/18 10:00


    3/23/18 07:36


 


 


 Lactated Ringer's  1,000 ml @ 


 30 mls/hr  Q24H PRN


 IV  3/23/18 13:30


 3/26/18 13:29  3/23/18 13:30


 


 


 Sodium Chloride  500 ml @ 


 30 mls/hr  [id number] PRN


 IV  3/23/18 13:30


 3/26/18 13:29   


 


 


  (Lopressor)  25 mg  ON CALL  PRN


 PO  3/23/18 13:30


 3/26/18 13:29   


 


 


  (Betadine 5%


 Antisepsis Kit)  1 applic  ON CALL  PRN


 EACH NARE  3/23/18 13:30


 3/26/18 13:29   


 


 


  (Chlorhexidine


 2% Cloth)  3 pack  ON CALL  PRN


 TOPICAL  3/23/18 13:30


 3/26/18 13:29   


 


 


  (NovoLIN R INJ)  See


 Protocol


 Table ...  ON CALL  PRN


 SQ  3/23/18 13:30


 3/26/18 13:29   


 











A/P


Assessment and Plan


86-year-old female with history of dementia, HTN, DM, CVA x2, COPD, presents 

from nursing home with 3 days of diarrhea and 1 day of rectal bleeding





Acute Colitis: CT abd/pelvis images reviewed, shows diffuse thickening of the 

wall the rectum, sigmoid colon, descending colon, splenic flexure and distal 

transverse colon suggestive of diffuse colitis. 


   -C.difficile negative and Stool cultures negative


   -+Leukocytosis with WBC 18K, lactic acid wnl


   -Supportive treatment with IVF hydration w/NS +KCl @ 84cc/hr, antiemetics prn

, pain control prn


   -Continue antibiotics with IV Cipro and Flagyl


   -advance diet as tolerated


   -S/p flex sigmoidoscopy 3/23 showed diverticulosis without colitis, +internal

/external hemorrhoids


   -GI Consulted, appreciate assistance


   -symptoms improving, no further abdominal pain, tolerating oral intake





Rectal Bleeding: suspect secondary to colitis as above. 


   -Hgb stable at 13.6, continue to monitor


   -GI consulted, flex sig showed internal and external hemorrhoids, likely 

source of bleed


   -no further bleeding noted, Hgb stable at 11.6





Choledocholithiasis: seen on CT, without definite obstruction. No evidence of 

jaundice. AST 91, , Alk Phos 139. 


   -LFTs trending down


   -GI consulted as above, ordered MRCP





Nephrolithiasis/Urinary Retention: CT also showed 8 mm calcified stone within 

the left renal pelvis without definite acute obstructive uropathy; also mild 

chronic prominence of the pelvicalyceal systems and ureters with diffuse 

thickening of the right collecting system, ureter and urinary bladder wall 

suggesting possible chronic inflammation. 


   -monitor urine output for now


   -patient urinating spontaneously





HEIKE on CKD stage III: Cr 1.19, previously 0.83 in Aug 2016. Suspect secondary 

to dehydration with recent diarrhea and poor oral intake. 


   -give IVF with NS +KCl at 84cc/hr


   -avoid nephrotoxins


   -monitor BMP, improving, Cr 0.92





HTN/HLD: chronic


   -continue patient's meds including metoprolol, cardizem, statin, niacin


   -initially held lisinopril with HEIKE, now restarted


   -monitor BP, adjust antihypertensives as needed





Diabetes Mellitus: chronic


   -continue patient's Levemir


   -hold patient's oral hypoglycemics including metformin


   -monitor Accu-cheks and cover with low dose SSI





Dementia: chronic


   -continue patient's Aricept


   -will need to return to nursing home at discharge





All other medical conditions stable, continue home medications as appropriate. 





DVT Prophylaxis: teds/SCDs; hold chemoprophylaxis with GI bleeding as above


Discharge Planning


Possible discharge today if MRCP unremarkable and cleared by GI. Will need to 

return to SNF at discharge.











Rocio Valdez PA-C Mar 24, 2018 9:17 am

## 2018-03-24 NOTE — HHI.GIFU
Subjective


Remarks


Pt resting in bed


No GI complaints at this time


No BMs documented from today 


 (Massiel Caballero)





Objective


Vitals I&O





Vital Signs








  Date Time  Temp Pulse Resp B/P (MAP) Pulse Ox O2 Delivery O2 Flow Rate FiO2


 


3/24/18 14:49 97.6 60 16 120/57 (78) 95   


 


3/24/18 11:58 97.6 60 12 115/56 (75) 95   


 


3/24/18 07:59 97.4 60 16 135/67 (89) 97   


 


3/24/18 00:00 97.9 60 18 146/88 (107) 95   


 


3/23/18 21:57 96.2 60 16 130/56 (80) 94   














I/O      


 


 3/23/18 3/23/18 3/23/18 3/24/18 3/24/18 3/24/18





 07:00 15:00 23:00 07:00 15:00 23:00


 


Intake Total 200 ml 1500 ml 650 ml 200 ml  


 


Balance 200 ml 1500 ml 650 ml 200 ml  


 


      


 


Intake Oral 200 ml 200 ml 250 ml 200 ml  


 


IV Total  1200 ml 400 ml   


 


Other  100 ml    








Laboratory





Laboratory Tests








Test


  3/24/18


08:50


 


Blood Urea Nitrogen 4 


 


Creatinine 0.56 


 


Random Glucose 110 


 


Total Protein 6.1 


 


Albumin 2.1 


 


Calcium Level 7.9 


 


Alkaline Phosphatase 196 


 


Aspartate Amino Transf


(AST/SGOT) 78 


 


 


Alanine Aminotransferase


(ALT/SGPT) 44 


 


 


Total Bilirubin 0.3 


 


Sodium Level 143 


 


Potassium Level 3.6 


 


Chloride Level 109 


 


Carbon Dioxide Level 24.8 


 


Anion Gap 9 


 


Estimat Glomerular Filtration


Rate 103 


 














 Date/Time


Source Procedure


Growth Status


 


 


 3/21/18 09:00


Stool Stool - Final


NO ENTERIC PATHOGENS DETECTED BY PCR... Complete








Imaging





Last Impressions








Abdomen/Pelvis CT 3/20/18 0000 Signed





Impressions: 





 Service Date/Time:  Tuesday, March 20, 2018 07:49 - CONCLUSION:  1. Diffuse 





 thickening of the wall the rectum, sigmoid colon, descending colon, splenic 





 flexure and distal transverse colon suggestive of diffuse colitis. Clinical 





 correlation is recommended.  2. Choledocholithiasis without definite 





 obstruction. Correlation with alkaline phosphatase and bilirubin levels is 





 suggested.  3. 8 mm calcified stone within the left renal pelvis without 





 definite acute obstructive uropathy.  4. Mild chronic prominence of the 





 pelvicalyceal systems and ureters with diffuse thickening of the right 





 collecting system, ureter and urinary bladder wall suggesting possible chronic 





 inflammation.  5. Stable right adrenal nodule measuring 1.8 x 1.1 cm.  6. 





 Diffuse pancreatic atrophy. 7. Stable right common iliac artery aneurysm 





 measuring 2.6 cm in greatest dimension.     Tian Hummel MD 








Physical Exam


HEENT:Normocephalic; atraumatic


CHEST: Even/unlabored 


CARDIAC:  RRR


ABDOMEN:  Obese, soft, nontender; bowel sounds active 


EXTREMITIES: No clubbing, cyanosis, or edema.


SKIN:  Normal; no rash; no jaundice.


CNS:  Awake 


 (Massiel Caballero)





Assessment and Plan


Plan


ASSESSMENT


- CT findings suggestive of colitis- Diffuse thickening of wall the rectum, 

sigmoid colon,


  descending colon, splenic flexure and distal transverse colon suggestive of 

diffuse colitis.


  C. Diff negative.  Stool enteric pathogens pending. Leukocytosis improving. 

Pt on Cipro


  and Flagyl.  


- Reports of rectal bleeding from rehab- H/H stable on admission- some drop 

today but


  remains stable WNL.  No reports of further rectal bleeding. 


- CT findings suggestive of choledocholithiasis- Alk phos now WNL.  LFTs 

trending down. 


  B bili WNL.  





(3/23) --> Pt sleeping and drowsy during my exam.  Does open her eyes with 

minimal


  responses.  No BM documented from yesterday.  Enteric pathogens negative. 

Would


  like to do sigmoidoscopy today if able to get consent.  Tried calling 

daughter Aury in


  [place], left message will try again later.  Other daughter Jelena lives in [place] 

according to


  chart and it is too early to contact her 


  Labs from today pending 


  **Consent obtained.  Per RN pt already had soap suds enema this AM and thinks 

2


  large BMs after 





(3/24) --> Pt resting in bed, in no apparent distress. MRCP pending. LFTs 

increased today.


  Currently Alk phos-196 AST-78 ALT-44 T bili-0.3 





PLAN


- MRCP


- Monitor LFTs 


- Monitor H/H


- Notify GI of active bleeding


- Flagyl


- Cipro


- Heart healthy diet 


- Further recommendations based on findings of above and clinical course





Pt has been seen and examined by myself and Dr. Gonzalez and this note is written 

on her behalf 





 (Massiel Caballero)











Massiel Caballero Mar 24, 2018 17:51


Jolie Gonzalez MD Mar 24, 2018 20:57

## 2018-03-25 VITALS
DIASTOLIC BLOOD PRESSURE: 86 MMHG | OXYGEN SATURATION: 94 % | TEMPERATURE: 98 F | HEART RATE: 60 BPM | RESPIRATION RATE: 18 BRPM | SYSTOLIC BLOOD PRESSURE: 188 MMHG

## 2018-03-25 VITALS
RESPIRATION RATE: 18 BRPM | DIASTOLIC BLOOD PRESSURE: 89 MMHG | HEART RATE: 89 BPM | OXYGEN SATURATION: 98 % | SYSTOLIC BLOOD PRESSURE: 145 MMHG | TEMPERATURE: 98.7 F

## 2018-03-25 VITALS
RESPIRATION RATE: 18 BRPM | TEMPERATURE: 98.1 F | DIASTOLIC BLOOD PRESSURE: 72 MMHG | OXYGEN SATURATION: 95 % | SYSTOLIC BLOOD PRESSURE: 146 MMHG | HEART RATE: 62 BPM

## 2018-03-25 VITALS
DIASTOLIC BLOOD PRESSURE: 60 MMHG | OXYGEN SATURATION: 96 % | RESPIRATION RATE: 20 BRPM | SYSTOLIC BLOOD PRESSURE: 148 MMHG | HEART RATE: 62 BPM | TEMPERATURE: 98.2 F

## 2018-03-25 VITALS
SYSTOLIC BLOOD PRESSURE: 142 MMHG | RESPIRATION RATE: 18 BRPM | TEMPERATURE: 98.2 F | HEART RATE: 68 BPM | DIASTOLIC BLOOD PRESSURE: 60 MMHG | OXYGEN SATURATION: 96 %

## 2018-03-25 VITALS
OXYGEN SATURATION: 96 % | DIASTOLIC BLOOD PRESSURE: 67 MMHG | SYSTOLIC BLOOD PRESSURE: 146 MMHG | RESPIRATION RATE: 18 BRPM | HEART RATE: 60 BPM | TEMPERATURE: 97.7 F

## 2018-03-25 RX ADMIN — CIPROFLOXACIN SCH MLS/HR: 2 INJECTION, SOLUTION INTRAVENOUS at 23:07

## 2018-03-25 RX ADMIN — SODIUM CHLORIDE SCH MLS/HR: 900 INJECTION, SOLUTION INTRAVENOUS at 16:03

## 2018-03-25 RX ADMIN — Medication SCH MG: at 23:08

## 2018-03-25 RX ADMIN — SODIUM CHLORIDE AND POTASSIUM CHLORIDE SCH MLS/HR: 9; 1.49 INJECTION, SOLUTION INTRAVENOUS at 08:25

## 2018-03-25 RX ADMIN — CIPROFLOXACIN SCH MLS/HR: 2 INJECTION, SOLUTION INTRAVENOUS at 09:58

## 2018-03-25 RX ADMIN — ACYCLOVIR SCH UNITS: 800 TABLET ORAL at 23:32

## 2018-03-25 RX ADMIN — SODIUM CHLORIDE SCH MLS/HR: 900 INJECTION, SOLUTION INTRAVENOUS at 03:00

## 2018-03-25 RX ADMIN — METOPROLOL TARTRATE SCH MG: 100 TABLET, FILM COATED ORAL at 23:08

## 2018-03-25 RX ADMIN — SODIUM CHLORIDE SCH MLS/HR: 900 INJECTION, SOLUTION INTRAVENOUS at 08:05

## 2018-03-25 RX ADMIN — PANTOPRAZOLE SODIUM SCH MG: 40 INJECTION, POWDER, FOR SOLUTION INTRAVENOUS at 06:44

## 2018-03-25 RX ADMIN — LISINOPRIL SCH MG: 10 TABLET ORAL at 10:09

## 2018-03-25 RX ADMIN — SODIUM CHLORIDE AND POTASSIUM CHLORIDE SCH MLS/HR: 9; 1.49 INJECTION, SOLUTION INTRAVENOUS at 03:00

## 2018-03-25 RX ADMIN — DONEPEZIL HYDROCHLORIDE SCH MG: 5 TABLET, FILM COATED ORAL at 23:08

## 2018-03-25 RX ADMIN — ACYCLOVIR SCH UNITS: 800 TABLET ORAL at 09:59

## 2018-03-25 RX ADMIN — DILTIAZEM HYDROCHLORIDE SCH MG: 240 CAPSULE, EXTENDED RELEASE ORAL at 10:08

## 2018-03-25 RX ADMIN — FAMOTIDINE SCH MG: 20 TABLET, FILM COATED ORAL at 10:00

## 2018-03-25 RX ADMIN — SODIUM CHLORIDE AND POTASSIUM CHLORIDE SCH MLS/HR: 9; 1.49 INJECTION, SOLUTION INTRAVENOUS at 23:07

## 2018-03-25 RX ADMIN — GABAPENTIN SCH MG: 100 CAPSULE ORAL at 23:08

## 2018-03-25 RX ADMIN — SODIUM CHLORIDE SCH MLS/HR: 900 INJECTION, SOLUTION INTRAVENOUS at 20:00

## 2018-03-25 RX ADMIN — METOPROLOL TARTRATE SCH MG: 100 TABLET, FILM COATED ORAL at 10:08

## 2018-03-25 RX ADMIN — ISOSORBIDE MONONITRATE SCH MG: 60 TABLET, EXTENDED RELEASE ORAL at 06:45

## 2018-03-25 RX ADMIN — LISINOPRIL SCH MG: 10 TABLET ORAL at 10:00

## 2018-03-25 RX ADMIN — INSULIN ASPART SCH: 100 INJECTION, SOLUTION INTRAVENOUS; SUBCUTANEOUS at 23:31

## 2018-03-25 RX ADMIN — INSULIN ASPART SCH: 100 INJECTION, SOLUTION INTRAVENOUS; SUBCUTANEOUS at 09:58

## 2018-03-25 RX ADMIN — DILTIAZEM HYDROCHLORIDE SCH MG: 240 CAPSULE, EXTENDED RELEASE ORAL at 09:59

## 2018-03-25 RX ADMIN — Medication SCH ML: at 10:00

## 2018-03-25 RX ADMIN — FAMOTIDINE SCH MG: 20 TABLET, FILM COATED ORAL at 10:09

## 2018-03-25 RX ADMIN — INSULIN ASPART SCH: 100 INJECTION, SOLUTION INTRAVENOUS; SUBCUTANEOUS at 12:28

## 2018-03-25 RX ADMIN — METOPROLOL TARTRATE SCH MG: 100 TABLET, FILM COATED ORAL at 10:00

## 2018-03-25 RX ADMIN — INSULIN ASPART SCH: 100 INJECTION, SOLUTION INTRAVENOUS; SUBCUTANEOUS at 18:43

## 2018-03-25 RX ADMIN — LEVOTHYROXINE SODIUM SCH MCG: 25 TABLET ORAL at 06:45

## 2018-03-25 RX ADMIN — Medication SCH ML: at 23:07

## 2018-03-25 RX ADMIN — ATORVASTATIN CALCIUM SCH MG: 20 TABLET, FILM COATED ORAL at 23:08

## 2018-03-25 RX ADMIN — PANTOPRAZOLE SODIUM SCH MG: 40 INJECTION, POWDER, FOR SOLUTION INTRAVENOUS at 18:43

## 2018-03-25 NOTE — RADRPT
EXAM DATE/TIME:  03/25/2018 15:22 

 

[facility] COMPARISON:     

CT ABDOMEN & PELVIS W CONTRAST, March 20, 2018, 7:49.

        

 

 

INDICATIONS :     

Abnormal labs.

                     

 

MEDICAL HISTORY :     

Dementia. Hypercholesterolemia. Chronic obstructive pulmonary disease. GERD. Cerebrovascular accident
. Peripheral vascular disease. Myocardial infarction. Bradycardia. Pulmonary embolism. Kidney stones.
 Arthritis. Diabetes. Depression. Anxiety. Back pain. Shingles. MDRO.

 

SURGICAL HISTORY :     

Hysterectomy. Appendectomy. Cholecystectomy. Right ureteral stent. Pacemaker.

 

ENCOUNTER:     

Subsequent

 

ACUITY:     

1 day

 

PAIN SCORE:     

4/10

 

LOCATION:     

Bilateral upper quadrant 

                     

MEASUREMENTS:     

 

LIVER:     

13.8 cm length 

 

COMMON DUCT:     

5 mm

 

RIGHT KIDNEY:     

9.8 x 3.2 x 5.1  cm

 

SPLEEN:     

9.3 cm length

 

FINDINGS:     

 

LIVER:     

Normal echotexture without focal lesion or ductal dilatation.  

 

COMMON DUCT:     

No intraluminal mass or stone visualized.  

 

GALLBLADDER:     

The patient is status post cholecystectomy.

 

PANCREAS:     

The visualized portions are within normal limits.  

 

RIGHT KIDNEY:     

No hydronephrosis, stone or mass.  

 

SPLEEN:     

No focal lesion.  

 

CONCLUSION:     No acute disease.  

 

 

 

 Goyo Valente MD on March 25, 2018 at 15:46           

Board Certified Radiologist.

 This report was verified electronically.

## 2018-03-25 NOTE — HHI.GIFU
Subjective


Remarks


Pt resting in bed


US at bedside doing liver US


Pt with no complaints at this time





Objective


Vitals I&O





Vital Signs








  Date Time  Temp Pulse Resp B/P (MAP) Pulse Ox O2 Delivery O2 Flow Rate FiO2


 


3/25/18 09:01 98.2 68 18 142/60 (87) 96   


 


3/25/18 03:25 97.7 60 18 146/67 (93) 96   


 


3/25/18 00:41 98.7 89 18 145/89 (107) 98   


 


3/24/18 21:13 98.0 65 18 132/60 (84) 98   


 


3/24/18 19:53 97.9 60 18 132/60 (84) 97   














I/O      


 


 3/24/18 3/24/18 3/24/18 3/25/18 3/25/18 3/25/18





 07:00 15:00 23:00 07:00 15:00 23:00


 


Intake Total 200 ml     


 


Balance 200 ml     


 


      


 


Intake Oral 200 ml     








Laboratory











 Date/Time


Source Procedure


Growth Status


 


 


 3/21/18 09:00


Stool Stool - Final


NO ENTERIC PATHOGENS DETECTED BY PCR... Complete








Imaging





Last Impressions








Abdomen/Pelvis CT 3/20/18 0000 Signed





Impressions: 





 Service Date/Time:  Tuesday, March 20, 2018 07:49 - CONCLUSION:  1. Diffuse 





 thickening of the wall the rectum, sigmoid colon, descending colon, splenic 





 flexure and distal transverse colon suggestive of diffuse colitis. Clinical 





 correlation is recommended.  2. Choledocholithiasis without definite 





 obstruction. Correlation with alkaline phosphatase and bilirubin levels is 





 suggested.  3. 8 mm calcified stone within the left renal pelvis without 





 definite acute obstructive uropathy.  4. Mild chronic prominence of the 





 pelvicalyceal systems and ureters with diffuse thickening of the right 





 collecting system, ureter and urinary bladder wall suggesting possible chronic 





 inflammation.  5. Stable right adrenal nodule measuring 1.8 x 1.1 cm.  6. 





 Diffuse pancreatic atrophy. 7. Stable right common iliac artery aneurysm 





 measuring 2.6 cm in greatest dimension.     Tian Hummel MD 








Physical Exam


HEENT:Normocephalic; atraumatic


CHEST: Even/unlabored 


CARDIAC:  RRR


ABDOMEN:  Obese, soft, nontender; bowel sounds active 


EXTREMITIES: No clubbing, cyanosis, or edema.


SKIN:  Normal; no rash; no jaundice.


CNS:  Awake





Assessment and Plan


Plan


ASSESSMENT


- CT findings suggestive of colitis- Diffuse thickening of wall the rectum, 

sigmoid colon,


  descending colon, splenic flexure and distal transverse colon suggestive of 

diffuse colitis.


  C. Diff negative.  Stool enteric pathogens pending. Leukocytosis improving. 

Pt on Cipro


  and Flagyl.  


- Reports of rectal bleeding from rehab- H/H stable on admission- some drop 

today but


  remains stable WNL.  No reports of further rectal bleeding. 


- CT findings suggestive of choledocholithiasis- Alk phos now WNL.  LFTs 

trending down. 


  B bili WNL.  





(3/23) --> Pt sleeping and drowsy during my exam.  Does open her eyes with 

minimal


  responses.  No BM documented from yesterday.  Enteric pathogens negative. 

Would


  like to do sigmoidoscopy today if able to get consent.  Tried calling 

daughter Aury in


  [place], left message will try again later.  Other daughter Jelena lives in [place] 

according to


  chart and it is too early to contact her 


  Labs from today pending 


  **Consent obtained.  Per RN pt already had soap suds enema this AM and thinks 

2


  large BMs after 





(3/24) --> Pt resting in bed, in no apparent distress. MRCP pending. LFTs 

increased today.


  Currently Alk phos-196 AST-78 ALT-44 T bili-0.3 





(3/25) Pt unable to have MRCP because of pacemaker- Liver US done negative for 

acute


  disease. HIDA scan --> No sign of biliary obstruction.  Plan is to repeat 

LFTs in AM,


  if OK then pt OK from a GI standpoint to be DCd 





PLAN


- Repeat LFTs in AM


- Flagyl


- Cipro


- Heart healthy diet 


- If repeat LFTs are OK, then OK to be DCd from a GI standpoint


- If pt is discharged have her follow up in the office 





Pt has been seen and examined by myself and Dr. Gonzalez and this note is written 

on her behalf











Massiel Caballero Mar 25, 2018 16:34

## 2018-03-25 NOTE — RADRPT
EXAM DATE/TIME:  03/25/2018 12:59 

 

[facility] COMPARISON:     

CT ABDOMEN & PELVIS W CONTRAST, March 20, 2018, 7:49.

 

 

INDICATIONS :      

Abdominal pain with nausea and vomiting. Elevated liver enzymes.

                   

 

DOSE:      

4.2 mCi Tc99m Mebrofenin IV 

                   

                   

 

MEDICAL HISTORY :     

Diabetes mellitus type 2. Chronic obstructive pulmonary disease. Hypertension. 

 

SURGICAL HISTORY :      

Cholecystectomy.  Hysterectomy. Pacemaker. 

 

ENCOUNTER:     

Initial

 

ACUITY:     

1 day

 

PAIN SCALE:     

2/10

 

LOCATION:      

Right upper quadrant 

 

TECHNIQUE:     

Following the intravenous administration of radiotracer, dynamic sequential images were performed wit
h continuous acquisition.

 

FINDINGS:     

 

HEPATIC KINETICS:     

There is prompt uptake of radiotracer in the liver.  No focal defects are seen.  There is normal rate
 of washout from the hepatic parenchyma.

 

BILIARY CLEARANCE:     

Activity is first seen in the extrahepatic biliary system at 25 minutes.  There is normal excretion i
nto the small bowel.

 

GALLBLADDER:     

The patient is status post cholecystectomy.

 

BILIARY ENTRIC REFLUX:     

None observed.

 

CONCLUSION:     No sign of biliary obstruction.

 

 

 

 Goyo Valente MD on March 25, 2018 at 14:19           

Board Certified Radiologist.

 This report was verified electronically.

## 2018-03-25 NOTE — HHI.PR
Subjective


Remarks


Follow up for colitis, rectal bleeding, abdominal pain, choledocholithiasis. 

The patient is pleasantly confused, oriented to self, hospital, and president 

[place] this morning. She denies any abdominal pain, nausea/vomiting, or 

diarrhea. She wants to eat. No fevers/chills. Unable to get MRCP due to pacer.





Objective


Vitals





Vital Signs








  Date Time  Temp Pulse Resp B/P (MAP) Pulse Ox O2 Delivery O2 Flow Rate FiO2


 


3/25/18 03:25 97.7 60 18 146/67 (93) 96   


 


3/25/18 00:41 98.7 89 18 145/89 (107) 98   


 


3/24/18 21:13 98.0 65 18 132/60 (84) 98   


 


3/24/18 19:53 97.9 60 18 132/60 (84) 97   


 


3/24/18 14:49 97.6 60 16 120/57 (78) 95   


 


3/24/18 11:58 97.6 60 12 115/56 (75) 95   














I/O      


 


 3/24/18 3/24/18 3/24/18 3/25/18 3/25/18 3/25/18





 07:00 15:00 23:00 07:00 15:00 23:00


 


Intake Total 200 ml     


 


Balance 200 ml     


 


      


 


Intake Oral 200 ml     








Result Diagram:  


3/22/18 0619                                                                   

             3/24/18 0850





Imaging





Last Impressions








Abdomen/Pelvis CT 3/20/18 0000 Signed





Impressions: 





 Service Date/Time:  Tuesday, March 20, 2018 07:49 - CONCLUSION:  1. Diffuse 





 thickening of the wall the rectum, sigmoid colon, descending colon, splenic 





 flexure and distal transverse colon suggestive of diffuse colitis. Clinical 





 correlation is recommended.  2. Choledocholithiasis without definite 





 obstruction. Correlation with alkaline phosphatase and bilirubin levels is 





 suggested.  3. 8 mm calcified stone within the left renal pelvis without 





 definite acute obstructive uropathy.  4. Mild chronic prominence of the 





 pelvicalyceal systems and ureters with diffuse thickening of the right 





 collecting system, ureter and urinary bladder wall suggesting possible chronic 





 inflammation.  5. Stable right adrenal nodule measuring 1.8 x 1.1 cm.  6. 





 Diffuse pancreatic atrophy. 7. Stable right common iliac artery aneurysm 





 measuring 2.6 cm in greatest dimension.     Tian Hummel MD 








Objective Remarks


GENERAL: Well-nourished, well-developed pleasantly confused elderly female 

patient in NAD.


SKIN: Warm and dry. No rash.


HEENT:  Normocephalic. Atraumatic.Pupils equal and round. Mucous membranes pink 

and moist.


CARDIOVASCULAR: Regular rate and rhythm. No murmur appreciated. 


RESPIRATORY: No accessory muscle use. Clear to auscultation. Breath sounds 

equal bilaterally.  


GASTROINTESTINAL: Abdomen soft, nondistended, nontender today. Normoactive 

bowel sounds x4.


MUSCULOSKELETAL: No obvious deformities. Extremities without clubbing, cyanosis

, or edema. 


NEUROLOGICAL: Awake and alert. No obvious cranial nerve deficits.  Motor 

grossly within normal limits. Moving all extremities spontaneously. Normal 

speech.


PSYCHIATRIC: Appropriate mood and affect; insight and judgment limited.


Procedures


3/23 - Flex Sigmoidoscopy by Dr. Gonzalez - Showed Diverticulosis sigmoid,

descending. No colitis noted-random biopsies from descending, sigmoid, rectum.  

Retroflexed views revealed internal hemorrhoid. Revealed external hemorrhoids.


Medications and IVs





Current Medications








 Medications


  (Trade)  Dose


 Ordered  Sig/George


 Route  Start Time


 Stop Time Status Last Admin


 


  (NS Flush)  2 ml  UNSCH  PRN


 IV FLUSH  3/20/18 01:30


     


 


 


  (NS Flush)  2 ml  BID


 IV FLUSH  3/20/18 09:00


    3/25/18 10:00


 


 


  (Narcan Inj)  0.4 mg  UNSCH  PRN


 IV PUSH  3/20/18 01:30


     


 


 


 Metronidazole  100 ml @ 


 100 mls/hr  Q6H


 IV  3/20/18 02:00


    3/25/18 08:05


 


 


  (Protonix Inj)  40 mg  Q12H


 IV PUSH  3/20/18 06:00


    3/25/18 06:44


 


 


 Ciprofloxacin/


 Dextrose  200 ml @ 


 200 mls/hr  Q12H


 IV  3/20/18 08:00


    3/25/18 09:58


 


 


 Potassium


 Chloride/Sodium


 Chloride  1,000 ml @ 


 84 mls/hr  [id number]


 IV  3/20/18 09:15


    3/25/18 03:00


 


 


  (Lipitor)  20 mg  HS


 PO  3/20/18 21:00


    3/24/18 00:18


 


 


  (Cardizem Cd)  240 mg  DAILY


 PO  3/20/18 09:15


    3/24/18 09:28


 


 


  (Aricept)  10 mg  HS


 PO  3/20/18 21:00


    3/24/18 00:18


 


 


  (Pepcid)  20 mg  DAILY


 PO  3/20/18 09:15


    3/24/18 09:29


 


 


  (Neurontin)  100 mg  HS


 PO  3/20/18 21:00


    3/24/18 00:18


 


 


  (Levemir Inj)  22 units  DAILY


 SQ  3/20/18 09:15


    3/25/18 09:59


 


 


  (Levemir Inj)  30 units  HS


 SQ  3/20/18 21:00


    3/21/18 21:00


 


 


  (Imdur)  60 mg  DAILY@0700


 PO  3/20/18 09:15


    3/24/18 09:28


 


 


  (Synthroid)  25 mcg  DAILY@0600


 PO  3/20/18 09:15


    3/24/18 06:02


 


 


  (Lopressor)  100 mg  BID


 PO  3/20/18 09:15


    3/24/18 09:29


 


 


  (Norco  5-325 Mg)  1 tab  Q4H  PRN


 PO  3/20/18 09:15


     


 


 


  (Morphine Inj)  1 mg  Q3H  PRN


 IV PUSH  3/20/18 09:15


     


 


 


  (D50w (Vial) Inj)  50 ml  UNSCH  PRN


 IV PUSH  3/20/18 09:15


     


 


 


  (Glucagon Inj)  1 mg  UNSCH  PRN


 OTHER  3/20/18 09:15


     


 


 


  (NovoLOG


 SUPPLEMENTAL


 SCALE)  1  ACHS SLIDING  SCALE


 SQ  3/20/18 12:00


    3/25/18 09:58


 


 


  (Pill Splitter)  1 ea  UNSCH  PRN


 OTHER  3/20/18 09:45


     


 


 


  (Slo-Niacin)  250 mg  HS


 PO  3/20/18 22:44


    3/24/18 00:18


 


 


  (Prinivil)  10 mg  DAILY


 PO  3/22/18 10:00


    3/24/18 09:28


 


 


 Lactated Ringer's  1,000 ml @ 


 30 mls/hr  Q24H PRN


 IV  3/23/18 13:30


 3/26/18 13:29  3/23/18 13:30


 


 


 Sodium Chloride  500 ml @ 


 30 mls/hr  [id number] PRN


 IV  3/23/18 13:30


 3/26/18 13:29   


 


 


  (Lopressor)  25 mg  ON CALL  PRN


 PO  3/23/18 13:30


 3/26/18 13:29   


 


 


  (Betadine 5%


 Antisepsis Kit)  1 applic  ON CALL  PRN


 EACH NARE  3/23/18 13:30


 3/26/18 13:29   


 


 


  (Chlorhexidine


 2% Cloth)  3 pack  ON CALL  PRN


 TOPICAL  3/23/18 13:30


 3/26/18 13:29   


 


 


  (NovoLIN R INJ)  See


 Protocol


 Table ...  ON CALL  PRN


 SQ  3/23/18 13:30


 3/26/18 13:29   


 











A/P


Assessment and Plan


86-year-old female with history of dementia, HTN, DM, CVA x2, COPD, presents 

from nursing home with 3 days of diarrhea and 1 day of rectal bleeding





Acute Colitis: CT abd/pelvis images reviewed, shows diffuse thickening of the 

wall the rectum, sigmoid colon, descending colon, splenic flexure and distal 

transverse colon suggestive of diffuse colitis. 


   -C.difficile negative and Stool cultures negative


   -+Leukocytosis with WBC 18K, lactic acid wnl


   -Supportive treatment with IVF hydration w/NS +KCl @ 84cc/hr, antiemetics prn

, pain control prn


   -Continue antibiotics with IV Cipro and Flagyl


   -advance diet as tolerated


   -S/p flex sigmoidoscopy 3/23 showed diverticulosis without colitis, +internal

/external hemorrhoids


   -GI Consulted, appreciate assistance


   -symptoms improving, no further abdominal pain, tolerating oral intake





Rectal Bleeding: suspect secondary to colitis as above. 


   -Hgb stable at 13.6, continue to monitor


   -GI consulted, flex sig showed internal and external hemorrhoids, likely 

source of bleed


   -no further bleeding noted, Hgb stable at 11.6





Choledocholithiasis: seen on CT, without definite obstruction. No evidence of 

jaundice. AST 91, , Alk Phos 139. 


   -LFTs trending down however still elevated


   -GI consulted as above, ordered MRCP however unable to have MRCP due to pacer


   -Discussed with Dr. Gonzalez, plan for HIDA and U/S, if unremarkable, ok to d/c





Nephrolithiasis/Urinary Retention: CT also showed 8 mm calcified stone within 

the left renal pelvis without definite acute obstructive uropathy; also mild 

chronic prominence of the pelvicalyceal systems and ureters with diffuse 

thickening of the right collecting system, ureter and urinary bladder wall 

suggesting possible chronic inflammation. 


   -monitor urine output for now


   -patient urinating spontaneously





HEIKE on CKD stage III: Cr 1.19, previously 0.83 in Aug 2016. Suspect secondary 

to dehydration with recent diarrhea and poor oral intake. 


   -give IVF with NS +KCl at 84cc/hr


   -avoid nephrotoxins


   -monitor BMP, improving, Cr 0.56





HTN/HLD: chronic


   -continue patient's meds including metoprolol, cardizem, statin, niacin


   -initially held lisinopril with HEIKE, now restarted


   -monitor BP, adjust antihypertensives as needed





Diabetes Mellitus: chronic


   -continue patient's Levemir


   -hold patient's oral hypoglycemics including metformin


   -monitor Accu-cheks and cover with low dose SSI





Dementia: chronic


   -continue patient's Aricept


   -will need to return to nursing home at discharge





All other medical conditions stable, continue home medications as appropriate. 





DVT Prophylaxis: teds/SCDs; hold chemoprophylaxis with GI bleeding as above


Discharge Planning


Plan to discharge today if HIDA scan and U/S liver unremarkable, discussed with 

Dr. Gonzalez. Will need to return to SNF at discharge.











Rocio Valdez PA-C Mar 25, 2018 8:38 am

## 2018-03-26 VITALS
OXYGEN SATURATION: 93 % | DIASTOLIC BLOOD PRESSURE: 63 MMHG | SYSTOLIC BLOOD PRESSURE: 135 MMHG | HEART RATE: 81 BPM | TEMPERATURE: 98.6 F | RESPIRATION RATE: 17 BRPM

## 2018-03-26 VITALS
TEMPERATURE: 98.3 F | RESPIRATION RATE: 18 BRPM | OXYGEN SATURATION: 96 % | SYSTOLIC BLOOD PRESSURE: 143 MMHG | DIASTOLIC BLOOD PRESSURE: 89 MMHG | HEART RATE: 86 BPM

## 2018-03-26 VITALS
HEART RATE: 63 BPM | DIASTOLIC BLOOD PRESSURE: 64 MMHG | RESPIRATION RATE: 16 BRPM | TEMPERATURE: 98.1 F | SYSTOLIC BLOOD PRESSURE: 145 MMHG | OXYGEN SATURATION: 95 %

## 2018-03-26 VITALS
RESPIRATION RATE: 18 BRPM | HEART RATE: 64 BPM | TEMPERATURE: 98.2 F | DIASTOLIC BLOOD PRESSURE: 58 MMHG | SYSTOLIC BLOOD PRESSURE: 129 MMHG | OXYGEN SATURATION: 95 %

## 2018-03-26 VITALS
SYSTOLIC BLOOD PRESSURE: 138 MMHG | OXYGEN SATURATION: 92 % | HEART RATE: 68 BPM | TEMPERATURE: 98.2 F | DIASTOLIC BLOOD PRESSURE: 62 MMHG | RESPIRATION RATE: 12 BRPM

## 2018-03-26 VITALS
DIASTOLIC BLOOD PRESSURE: 65 MMHG | HEART RATE: 63 BPM | SYSTOLIC BLOOD PRESSURE: 144 MMHG | RESPIRATION RATE: 18 BRPM | TEMPERATURE: 98.4 F | OXYGEN SATURATION: 96 %

## 2018-03-26 LAB
% SATURATION IRON PROFILE: 19.9 % (ref 20–50)
ALBUMIN SERPL-MCNC: 2.1 GM/DL (ref 3.4–5)
ALP SERPL-CCNC: 132 U/L (ref 45–117)
ALT SERPL-CCNC: 25 U/L (ref 10–53)
AST SERPL-CCNC: 17 U/L (ref 15–37)
BASOPHILS # BLD AUTO: 0.1 TH/MM3 (ref 0–0.2)
BASOPHILS NFR BLD: 0.8 % (ref 0–2)
BILIRUB SERPL-MCNC: 0.2 MG/DL (ref 0.2–1)
BUN SERPL-MCNC: 3 MG/DL (ref 7–18)
CALCIUM SERPL-MCNC: 7.7 MG/DL (ref 8.5–10.1)
CHLORIDE SERPL-SCNC: 111 MEQ/L (ref 98–107)
CREAT SERPL-MCNC: 0.9 MG/DL (ref 0.5–1)
EOSINOPHIL # BLD: 0.2 TH/MM3 (ref 0–0.4)
EOSINOPHIL NFR BLD: 2 % (ref 0–4)
ERYTHROCYTE [DISTWIDTH] IN BLOOD BY AUTOMATED COUNT: 14.6 % (ref 11.6–17.2)
FERRITIN SERPL-MCNC: 58 NG/ML (ref 8–252)
GFR SERPLBLD BASED ON 1.73 SQ M-ARVRAT: 59 ML/MIN (ref 89–?)
GLUCOSE SERPL-MCNC: 294 MG/DL (ref 74–106)
HCO3 BLD-SCNC: 23.2 MEQ/L (ref 21–32)
HCT VFR BLD CALC: 37.1 % (ref 35–46)
HGB BLD-MCNC: 12.5 GM/DL (ref 11.6–15.3)
IRON (FE): 46 MCG/DL (ref 50–170)
LYMPHOCYTES # BLD AUTO: 2 TH/MM3 (ref 1–4.8)
LYMPHOCYTES NFR BLD AUTO: 18.7 % (ref 9–44)
MCH RBC QN AUTO: 29.5 PG (ref 27–34)
MCHC RBC AUTO-ENTMCNC: 33.7 % (ref 32–36)
MCV RBC AUTO: 87.5 FL (ref 80–100)
MONOCYTE #: 1 TH/MM3 (ref 0–0.9)
MONOCYTES NFR BLD: 9.1 % (ref 0–8)
NEUTROPHILS # BLD AUTO: 7.3 TH/MM3 (ref 1.8–7.7)
NEUTROPHILS NFR BLD AUTO: 69.4 % (ref 16–70)
PLATELET # BLD: 295 TH/MM3 (ref 150–450)
PMV BLD AUTO: 8.8 FL (ref 7–11)
PROT SERPL-MCNC: 5.6 GM/DL (ref 6.4–8.2)
RBC # BLD AUTO: 4.24 MIL/MM3 (ref 4–5.3)
SODIUM SERPL-SCNC: 142 MEQ/L (ref 136–145)
TIBC SERPL-MCNC: 231 MCG/DL (ref 250–450)
WBC # BLD AUTO: 10.5 TH/MM3 (ref 4–11)

## 2018-03-26 RX ADMIN — METOPROLOL TARTRATE SCH MG: 100 TABLET, FILM COATED ORAL at 21:46

## 2018-03-26 RX ADMIN — CIPROFLOXACIN SCH MLS/HR: 2 INJECTION, SOLUTION INTRAVENOUS at 21:45

## 2018-03-26 RX ADMIN — LISINOPRIL SCH MG: 10 TABLET ORAL at 09:44

## 2018-03-26 RX ADMIN — PANTOPRAZOLE SODIUM SCH MG: 40 INJECTION, POWDER, FOR SOLUTION INTRAVENOUS at 19:25

## 2018-03-26 RX ADMIN — ATORVASTATIN CALCIUM SCH MG: 20 TABLET, FILM COATED ORAL at 21:46

## 2018-03-26 RX ADMIN — LEVOTHYROXINE SODIUM SCH MCG: 25 TABLET ORAL at 06:34

## 2018-03-26 RX ADMIN — GABAPENTIN SCH MG: 100 CAPSULE ORAL at 21:46

## 2018-03-26 RX ADMIN — FAMOTIDINE SCH MG: 20 TABLET, FILM COATED ORAL at 09:44

## 2018-03-26 RX ADMIN — INSULIN ASPART SCH: 100 INJECTION, SOLUTION INTRAVENOUS; SUBCUTANEOUS at 13:02

## 2018-03-26 RX ADMIN — Medication SCH ML: at 21:00

## 2018-03-26 RX ADMIN — SODIUM CHLORIDE AND POTASSIUM CHLORIDE SCH MLS/HR: 9; 1.49 INJECTION, SOLUTION INTRAVENOUS at 20:10

## 2018-03-26 RX ADMIN — SODIUM CHLORIDE SCH MLS/HR: 900 INJECTION, SOLUTION INTRAVENOUS at 02:11

## 2018-03-26 RX ADMIN — Medication SCH ML: at 09:00

## 2018-03-26 RX ADMIN — METOPROLOL TARTRATE SCH MG: 100 TABLET, FILM COATED ORAL at 09:44

## 2018-03-26 RX ADMIN — INSULIN ASPART SCH: 100 INJECTION, SOLUTION INTRAVENOUS; SUBCUTANEOUS at 21:00

## 2018-03-26 RX ADMIN — ATORVASTATIN CALCIUM SCH MG: 20 TABLET, FILM COATED ORAL at 21:00

## 2018-03-26 RX ADMIN — PANTOPRAZOLE SODIUM SCH MG: 40 INJECTION, POWDER, FOR SOLUTION INTRAVENOUS at 06:34

## 2018-03-26 RX ADMIN — CIPROFLOXACIN SCH MLS/HR: 2 INJECTION, SOLUTION INTRAVENOUS at 08:05

## 2018-03-26 RX ADMIN — FAMOTIDINE SCH MG: 20 TABLET, FILM COATED ORAL at 09:00

## 2018-03-26 RX ADMIN — ACYCLOVIR SCH UNITS: 800 TABLET ORAL at 21:00

## 2018-03-26 RX ADMIN — INSULIN ASPART SCH: 100 INJECTION, SOLUTION INTRAVENOUS; SUBCUTANEOUS at 09:44

## 2018-03-26 RX ADMIN — SODIUM CHLORIDE SCH MLS/HR: 900 INJECTION, SOLUTION INTRAVENOUS at 09:10

## 2018-03-26 RX ADMIN — GABAPENTIN SCH MG: 100 CAPSULE ORAL at 21:00

## 2018-03-26 RX ADMIN — DILTIAZEM HYDROCHLORIDE SCH MG: 240 CAPSULE, EXTENDED RELEASE ORAL at 09:00

## 2018-03-26 RX ADMIN — DILTIAZEM HYDROCHLORIDE SCH MG: 240 CAPSULE, EXTENDED RELEASE ORAL at 09:44

## 2018-03-26 RX ADMIN — METOPROLOL TARTRATE SCH MG: 100 TABLET, FILM COATED ORAL at 21:00

## 2018-03-26 RX ADMIN — Medication SCH MG: at 21:00

## 2018-03-26 RX ADMIN — ISOSORBIDE MONONITRATE SCH MG: 60 TABLET, EXTENDED RELEASE ORAL at 06:34

## 2018-03-26 RX ADMIN — ACYCLOVIR SCH UNITS: 800 TABLET ORAL at 09:44

## 2018-03-26 RX ADMIN — DONEPEZIL HYDROCHLORIDE SCH MG: 5 TABLET, FILM COATED ORAL at 21:46

## 2018-03-26 RX ADMIN — LISINOPRIL SCH MG: 10 TABLET ORAL at 09:00

## 2018-03-26 RX ADMIN — INSULIN ASPART SCH: 100 INJECTION, SOLUTION INTRAVENOUS; SUBCUTANEOUS at 17:00

## 2018-03-26 RX ADMIN — SODIUM CHLORIDE AND POTASSIUM CHLORIDE SCH MLS/HR: 9; 1.49 INJECTION, SOLUTION INTRAVENOUS at 14:18

## 2018-03-26 RX ADMIN — SODIUM CHLORIDE SCH MLS/HR: 900 INJECTION, SOLUTION INTRAVENOUS at 14:18

## 2018-03-26 RX ADMIN — Medication SCH MG: at 21:46

## 2018-03-26 RX ADMIN — METOPROLOL TARTRATE SCH MG: 100 TABLET, FILM COATED ORAL at 09:00

## 2018-03-26 RX ADMIN — DONEPEZIL HYDROCHLORIDE SCH MG: 5 TABLET, FILM COATED ORAL at 21:00

## 2018-03-26 NOTE — HHI.PR
Subjective


Remarks


Follow up for abdominal pain, colitis, elevated LFTs. The patient is sleeping 

upon my arrival, easily awakens. Seen with Hermelinda RN at bedside. She has no 

medical complaints including no fevers/chills, abdominal pain, nausea/vomiting, 

or diarrhea. She is tolerating oral intake, although does not like the hospital 

food.





Objective


Vitals





Vital Signs








  Date Time  Temp Pulse Resp B/P (MAP) Pulse Ox O2 Delivery O2 Flow Rate FiO2


 


3/26/18 11:20 98.2 68 12 138/62 (87) 92   


 


3/26/18 07:52 98.1 63 16 145/64 (91) 95   


 


3/26/18 05:48 98.3 86 18 143/89 (107) 96   


 


3/26/18 03:27 98.2 64 18 129/58 (81) 95   


 


3/25/18 23:52 98.0 60 18 188/86 (120) 94   


 


3/25/18 19:32 98.1 62 18 146/72 (96) 95   


 


3/25/18 16:41 98.2 62 20 148/60 (89) 96   














I/O      


 


 3/25/18 3/25/18 3/25/18 3/26/18 3/26/18 3/26/18





 07:00 15:00 23:00 07:00 15:00 23:00


 


Intake Total     200 ml 


 


Balance     200 ml 


 


      


 


IV Total     200 ml 


 


# Voids    2  








Result Diagram:  


3/26/18 1115                                                                   

             3/26/18 1115





Imaging





Last Impressions








Liver Ultrasound 3/25/18 0000 Signed





Impressions: 





 Service Date/Time:  Sunday, March 25, 2018 15:22 - CONCLUSION: No acute 

disease. 





       Goyo Valente MD 


 


Hepatobiliary Scan Nuclear Medicine 3/25/18 0000 Signed





Impressions: 





 Service Date/Time:  Sunday, March 25, 2018 12:59 - CONCLUSION: No sign of 





 biliary obstruction.     Goyo Valente MD 


 


Abdomen/Pelvis CT 3/20/18 0000 Signed





Impressions: 





 Service Date/Time:  Tuesday, March 20, 2018 07:49 - CONCLUSION:  1. Diffuse 





 thickening of the wall the rectum, sigmoid colon, descending colon, splenic 





 flexure and distal transverse colon suggestive of diffuse colitis. Clinical 





 correlation is recommended.  2. Choledocholithiasis without definite 





 obstruction. Correlation with alkaline phosphatase and bilirubin levels is 





 suggested.  3. 8 mm calcified stone within the left renal pelvis without 





 definite acute obstructive uropathy.  4. Mild chronic prominence of the 





 pelvicalyceal systems and ureters with diffuse thickening of the right 





 collecting system, ureter and urinary bladder wall suggesting possible chronic 





 inflammation.  5. Stable right adrenal nodule measuring 1.8 x 1.1 cm.  6. 





 Diffuse pancreatic atrophy. 7. Stable right common iliac artery aneurysm 





 measuring 2.6 cm in greatest dimension.     Tian Hummel MD 








Objective Remarks


GENERAL: Well-nourished, well-developed pleasantly confused elderly female 

patient in NAD.


SKIN: Warm and dry. No rash.


HEENT:  Normocephalic. Atraumatic.Pupils equal and round. Mucous membranes pink 

and moist.


CARDIOVASCULAR: Regular rate and rhythm. No murmur appreciated. 


RESPIRATORY: No accessory muscle use. Clear to auscultation. Breath sounds 

equal bilaterally.  


GASTROINTESTINAL: Abdomen soft, nondistended, nontender today. Normoactive 

bowel sounds x4.


MUSCULOSKELETAL: No obvious deformities. Extremities without clubbing, cyanosis

, or edema. 


NEUROLOGICAL: Awake and alert. No obvious cranial nerve deficits.  Motor 

grossly within normal limits. Moving all extremities spontaneously. Normal 

speech.


PSYCHIATRIC: Appropriate mood and affect; insight and judgment limited.


Procedures


3/23 - Flex Sigmoidoscopy by Dr. Gonzalez - Showed Diverticulosis sigmoid,

descending. No colitis noted-random biopsies from descending, sigmoid, rectum.  

Retroflexed views revealed internal hemorrhoid. Revealed external hemorrhoids.


Medications and IVs





Current Medications








 Medications


  (Trade)  Dose


 Ordered  Sig/George


 Route  Start Time


 Stop Time Status Last Admin


 


  (NS Flush)  2 ml  UNSCH  PRN


 IV FLUSH  3/20/18 01:30


     


 


 


  (NS Flush)  2 ml  BID


 IV FLUSH  3/20/18 09:00


    3/25/18 10:00


 


 


  (Narcan Inj)  0.4 mg  UNSCH  PRN


 IV PUSH  3/20/18 01:30


     


 


 


 Metronidazole  100 ml @ 


 100 mls/hr  Q6H


 IV  3/20/18 02:00


    3/26/18 09:10


 


 


  (Protonix Inj)  40 mg  Q12H


 IV PUSH  3/20/18 06:00


    3/25/18 18:43


 


 


 Ciprofloxacin/


 Dextrose  200 ml @ 


 200 mls/hr  Q12H


 IV  3/20/18 08:00


    3/26/18 08:05


 


 


 Potassium


 Chloride/Sodium


 Chloride  1,000 ml @ 


 84 mls/hr  [id number]


 IV  3/20/18 09:15


    3/25/18 23:07


 


 


  (Lipitor)  20 mg  HS


 PO  3/20/18 21:00


    3/24/18 00:18


 


 


  (Cardizem Cd)  240 mg  DAILY


 PO  3/20/18 09:15


    3/26/18 09:44


 


 


  (Aricept)  10 mg  HS


 PO  3/20/18 21:00


    3/24/18 00:18


 


 


  (Pepcid)  20 mg  DAILY


 PO  3/20/18 09:15


    3/26/18 09:44


 


 


  (Neurontin)  100 mg  HS


 PO  3/20/18 21:00


    3/24/18 00:18


 


 


  (Levemir Inj)  22 units  DAILY


 SQ  3/20/18 09:15


    3/26/18 09:44


 


 


  (Levemir Inj)  30 units  HS


 SQ  3/20/18 21:00


    3/25/18 23:32


 


 


  (Imdur)  60 mg  DAILY@0700


 PO  3/20/18 09:15


    3/24/18 09:28


 


 


  (Synthroid)  25 mcg  DAILY@0600


 PO  3/20/18 09:15


    3/24/18 06:02


 


 


  (Lopressor)  100 mg  BID


 PO  3/20/18 09:15


    3/26/18 09:44


 


 


  (Norco  5-325 Mg)  1 tab  Q4H  PRN


 PO  3/20/18 09:15


     


 


 


  (Morphine Inj)  1 mg  Q3H  PRN


 IV PUSH  3/20/18 09:15


     


 


 


  (D50w (Vial) Inj)  50 ml  UNSCH  PRN


 IV PUSH  3/20/18 09:15


     


 


 


  (Glucagon Inj)  1 mg  UNSCH  PRN


 OTHER  3/20/18 09:15


     


 


 


  (NovoLOG


 SUPPLEMENTAL


 SCALE)  1  ACHS SLIDING  SCALE


 SQ  3/20/18 12:00


    3/26/18 13:02


 


 


  (Pill Splitter)  1 ea  UNSCH  PRN


 OTHER  3/20/18 09:45


     


 


 


  (Slo-Niacin)  250 mg  HS


 PO  3/20/18 22:44


    3/24/18 00:18


 


 


  (Prinivil)  10 mg  DAILY


 PO  3/22/18 10:00


    3/26/18 09:44


 











A/P


Problem List:  


(1) Colitis


ICD Code:  K52.9 - Noninfective gastroenteritis and colitis, unspecified


(2) Internal and external hemorrhoids without complication


ICD Code:  K64.4 - Residual hemorrhoidal skin tags; K64.8 - Other hemorrhoids


(3) LFTs abnormal


ICD Code:  R94.5 - Abnormal results of liver function studies


(4) Diabetes mellitus type 2 in obese


ICD Code:  E11.9 - Diabetes mellitus type 2 in obese; E66.9 - Obesity, 

unspecified


Status:  Acute


(5) Lower GI bleed


ICD Code:  K92.2 - Gastrointestinal hemorrhage, unspecified


Status:  Acute


(6) Hypertension


ICD Code:  I10 - Hypertension


Status:  Acute


(7) Hyperlipidemia


ICD Code:  E78.5 - Hyperlipidemia


Status:  Acute


(8) Dehydration


ICD Code:  E86.0 - Dehydration


(9) Dementia


ICD Code:  F03.90 - Dementia


Status:  Acute


Assessment and Plan


86-year-old female with history of dementia, HTN, DM, CVA x2, COPD, presents 

from nursing home with 3 days of diarrhea and 1 day of rectal bleeding





Acute Colitis: CT abd/pelvis images reviewed, shows diffuse thickening of the 

wall the rectum, sigmoid colon, descending colon, splenic flexure and distal 

transverse colon suggestive of diffuse colitis. 


   -C.difficile negative and Stool cultures negative


   -+Leukocytosis with WBC 18K, lactic acid wnl


   -Supportive treatment with IVF hydration w/NS +KCl @ 84cc/hr, antiemetics prn

, pain control prn


   -Continue antibiotics with IV Cipro and Flagyl


   -advance diet as tolerated


   -S/p flex sigmoidoscopy 3/23 showed diverticulosis without colitis, +internal

/external hemorrhoids


   -GI Consulted, appreciate assistance


   -symptoms improving, no further abdominal pain, tolerating oral intake





Rectal Bleeding: suspect secondary to colitis as above. 


   -Hgb stable at 13.6, continue to monitor


   -GI consulted, flex sig showed internal and external hemorrhoids, likely 

source of bleed


   -no further bleeding noted, Hgb stable at 11.6





Choledocholithiasis: seen on CT, without definite obstruction. No evidence of 

jaundice. AST 91, , Alk Phos 139. 


   -LFTs trending down however still elevated


   -GI consulted as above, ordered MRCP however unable to have MRCP due to pacer


   -HIDA and Liver U/S unremarkable


   -GI recommending EUS with possible ERCP tomorrow





Nephrolithiasis/Urinary Retention: CT also showed 8 mm calcified stone within 

the left renal pelvis without definite acute obstructive uropathy; also mild 

chronic prominence of the pelvicalyceal systems and ureters with diffuse 

thickening of the right collecting system, ureter and urinary bladder wall 

suggesting possible chronic inflammation. 


   -monitor urine output for now


   -patient urinating spontaneously





HEIKE on CKD stage III: Cr 1.19, previously 0.83 in Aug 2016. Suspect secondary 

to dehydration with recent diarrhea and poor oral intake. 


   -give IVF with NS +KCl at 84cc/hr


   -avoid nephrotoxins


   -monitor BMP, improved





HTN/HLD: chronic


   -continue patient's meds including metoprolol, cardizem, statin, niacin


   -initially held lisinopril with HEIKE, now restarted


   -monitor BP, adjust antihypertensives as needed





Diabetes Mellitus: chronic


   -continue patient's Levemir


   -hold patient's oral hypoglycemics including metformin


   -monitor Accu-cheks and cover with low dose SSI





Dementia: chronic


   -continue patient's Aricept


   -will return to [facility] at discharge





All other medical conditions stable, continue home medications as appropriate. 





DVT Prophylaxis: teds/SCDs; hold chemoprophylaxis with GI bleeding as above


Discharge Planning


Discussed with Massiel DYSON, GI recommending EUS with possible ERCP tomorrow. 

Not yet cleared for discharge.  Will need to return to [facility] at discharge.











Rocio Valdez PA-C Mar 26, 2018 2:17 pm

## 2018-03-26 NOTE — HHI.GIFU
Subjective


Remarks


Anxious with any type of stimulation


Cursing out loud at times and doesn't want to be bothered


Obese


Afebrile


 (Dilcia Penaloza)





Objective


Vitals I&O





Vital Signs








  Date Time  Temp Pulse Resp B/P (MAP) Pulse Ox O2 Delivery O2 Flow Rate FiO2


 


3/26/18 11:20 98.2 68 12 138/62 (87) 92   


 


3/26/18 07:52 98.1 63 16 145/64 (91) 95   


 


3/26/18 05:48 98.3 86 18 143/89 (107) 96   


 


3/26/18 03:27 98.2 64 18 129/58 (81) 95   


 


3/25/18 23:52 98.0 60 18 188/86 (120) 94   


 


3/25/18 19:32 98.1 62 18 146/72 (96) 95   














I/O      


 


 3/25/18 3/25/18 3/25/18 3/26/18 3/26/18 3/26/18





 07:00 15:00 23:00 07:00 15:00 23:00


 


Intake Total     200 ml 


 


Balance     200 ml 


 


      


 


IV Total     200 ml 


 


# Voids    2  








Laboratory





Laboratory Tests








Test


  3/26/18


11:15


 


White Blood Count 10.5 


 


Red Blood Count 4.24 


 


Hemoglobin 12.5 


 


Hematocrit 37.1 


 


Mean Corpuscular Volume 87.5 


 


Mean Corpuscular Hemoglobin 29.5 


 


Mean Corpuscular Hemoglobin


Concent 33.7 


 


 


Red Cell Distribution Width 14.6 


 


Platelet Count 295 


 


Mean Platelet Volume 8.8 


 


Neutrophils (%) (Auto) 69.4 


 


Lymphocytes (%) (Auto) 18.7 


 


Monocytes (%) (Auto) 9.1 


 


Eosinophils (%) (Auto) 2.0 


 


Basophils (%) (Auto) 0.8 


 


Neutrophils # (Auto) 7.3 


 


Lymphocytes # (Auto) 2.0 


 


Monocytes # (Auto) 1.0 


 


Eosinophils # (Auto) 0.2 


 


Basophils # (Auto) 0.1 


 


CBC Comment DIFF FINAL 


 


Differential Comment  


 


Blood Urea Nitrogen 3 


 


Creatinine 0.90 


 


Random Glucose 294 


 


Total Protein 5.6 


 


Albumin 2.1 


 


Calcium Level 7.7 


 


Alkaline Phosphatase 132 


 


Aspartate Amino Transf


(AST/SGOT) 17 


 


 


Alanine Aminotransferase


(ALT/SGPT) 25 


 


 


Total Bilirubin 0.2 


 


Sodium Level 142 


 


Potassium Level 3.4 


 


Chloride Level 111 


 


Carbon Dioxide Level 23.2 


 


Anion Gap 8 


 


Estimat Glomerular Filtration


Rate 59 


 














 Date/Time


Source Procedure


Growth Status


 


 


 3/21/18 09:00


Stool Stool - Final


NO ENTERIC PATHOGENS DETECTED BY PCR... Complete








Imaging





Last Impressions








Liver Ultrasound 3/25/18 0000 Signed





Impressions: 





 Service Date/Time:  Sunday, March 25, 2018 15:22 - CONCLUSION: No acute 

disease. 





       Goyo Valente MD 


 


Hepatobiliary Scan Nuclear Medicine 3/25/18 0000 Signed





Impressions: 





 Service Date/Time:  Sunday, March 25, 2018 12:59 - CONCLUSION: No sign of 





 biliary obstruction.     Goyo Valente MD 


 


Abdomen/Pelvis CT 3/20/18 0000 Signed





Impressions: 





 Service Date/Time:  Tuesday, March 20, 2018 07:49 - CONCLUSION:  1. Diffuse 





 thickening of the wall the rectum, sigmoid colon, descending colon, splenic 





 flexure and distal transverse colon suggestive of diffuse colitis. Clinical 





 correlation is recommended.  2. Choledocholithiasis without definite 





 obstruction. Correlation with alkaline phosphatase and bilirubin levels is 





 suggested.  3. 8 mm calcified stone within the left renal pelvis without 





 definite acute obstructive uropathy.  4. Mild chronic prominence of the 





 pelvicalyceal systems and ureters with diffuse thickening of the right 





 collecting system, ureter and urinary bladder wall suggesting possible chronic 





 inflammation.  5. Stable right adrenal nodule measuring 1.8 x 1.1 cm.  6. 





 Diffuse pancreatic atrophy. 7. Stable right common iliac artery aneurysm 





 measuring 2.6 cm in greatest dimension.     Tian Hummel MD 








Physical Exam


HEENT:Normocephalic; atraumatic, obese


CHEST: Even/unlabored , no obvious shortness of breath, able to lie flat and 

rest


CARDIAC:  RRR


ABDOMEN:  Large, Obese, soft, nontender; bowel sounds active , possible mild 

bloating


EXTREMITIES: No clubbing, cyanosis, or edema.


SKIN:  Normal; no rash; no jaundice.


CNS:  Awake and stable verbal stimuli, confusion


 (Dilcia Penaloza)





Assessment and Plan


Plan


ASSESSMENT


- CT findings suggestive of colitis- Diffuse thickening of wall the rectum, 

sigmoid colon,


  descending colon, splenic flexure and distal transverse colon suggestive of 

diffuse colitis.


  C. Diff negative.  Stool enteric pathogens pending. Leukocytosis improving. 

Pt on Cipro


  and Flagyl.  


- Reports of rectal bleeding from rehab- H/H stable on admission- some drop 

today but


  remains stable WNL.  No reports of further rectal bleeding. 


- CT findings suggestive of choledocholithiasis- Alk phos now WNL.  LFTs 

trending down. 


  B bili WNL.  





(3/23) --> Pt sleeping and drowsy during my exam.  Does open her eyes with 

minimal


  responses.  No BM documented from yesterday.  Enteric pathogens negative. 

Would


  like to do sigmoidoscopy today if able to get consent.  Tried calling 

daughter Aury in


  [place], left message will try again later.  Other daughter Jelena lives in [place] 

according to


  chart and it is too early to contact her 


  Labs from today pending 


  **Consent obtained.  Per RN pt already had soap suds enema this AM and thinks 

2


  large BMs after 





(3/24) --> Pt resting in bed, in no apparent distress. MRCP pending. LFTs 

increased today.


  Currently Alk phos-196 AST-78 ALT-44 T bili-0.3 





(3/25) Pt unable to have MRCP because of pacemaker- Liver US done negative for 

acute


  disease. HIDA scan --> No sign of biliary obstruction.  Plan is to repeat 

LFTs in AM,


  if OK then pt OK from a GI standpoint to be DCd 


03/26/18, patient is confused and altered mental status.  Daughter is the go to 

person.  Discussed options of EUS with possible ERCP with daughter.  Okay with 

going through with procedure.  LFTs labs reviewed, AST 17/25 ALT, normal trends 

now





PLAN


- EUS with possible ERCP


- Flagyl


- Cipro


-Nothing by mouth after midnight


- Supportive care


- Monitor labs labs


- Plan pt is discharged have her follow up in the office 





Pt has been seen and examined by myself and Dr. Breaux and this note is 

written on his behalf 





 (Dilcia Penaloza)


Physician Comments


Patient seen and examined


Agree with above


Continue with current supportive care


Monitor lab


Plan for an EUS with possible ERCP tomorrow


 (Gordo Breaux MD)











Dilcia Penaloza Mar 26, 2018 16:53


Gordo Breaux MD Mar 26, 2018 23:58

## 2018-03-26 NOTE — HHI.DCPOC
Discharge Care Plan


Diagnosis:  


(1) Colitis


(2) Dehydration


(3) LFTs abnormal


(4) Lower GI bleed


(5) Internal and external hemorrhoids without complication


(6) Dementia


(7) Hypertension


(8) Hyperlipidemia


Goals to Promote Your Health


* To prevent worsening of your condition and complications


* To maintain your health at the optimal level


Directions to Meet Your Goals


*** Take your medications as prescribed


*** Follow your dietary instruction


*** Follow activity as directed








*** Keep your appointments as scheduled


*** Take your immunizations and boosters as scheduled


*** If your symptoms worsen call your PCP, if no PCP go to Urgent Care Center 

or Emergency Room***


*** Smoking is Dangerous to Your Health. Avoid second hand smoke***


***Call the 24-hour hour crisis hotline for domestic abuse at 1-777.448.1561***











Rocio Valdez PA-C Mar 26, 2018 7:58 am

## 2018-03-26 NOTE — HHI.DS
cc:   oJlie Gonzalez MD


__________________________________________________





Discharge Summary


Admission Date


Mar 20, 2018 at 01:23


Discharge Date:  Mar 26, 2018


Admitting Diagnosis





lower GI bleed, possible C. difficile colitis, leukocytosis





(1) Colitis


ICD Code:  K52.9 - Noninfective gastroenteritis and colitis, unspecified


(2) Internal and external hemorrhoids without complication


ICD Code:  K64.4 - Residual hemorrhoidal skin tags; K64.8 - Other hemorrhoids


(3) LFTs abnormal


ICD Code:  R94.5 - Abnormal results of liver function studies


(4) Diabetes mellitus type 2 in obese


ICD Code:  E11.9 - Diabetes mellitus type 2 in obese; E66.9 - Obesity, 

unspecified


Status:  Acute


(5) Lower GI bleed


ICD Code:  K92.2 - Gastrointestinal hemorrhage, unspecified


Status:  Acute


(6) Hypertension


ICD Code:  I10 - Hypertension


Status:  Acute


(7) Hyperlipidemia


ICD Code:  E78.5 - Hyperlipidemia


Status:  Acute


(8) Dehydration


ICD Code:  E86.0 - Dehydration


(9) Dementia


ICD Code:  F03.90 - Dementia


Status:  Acute


Procedures


3/23 - Flex Sigmoidoscopy by Dr. Gonzalez - Showed Diverticulosis sigmoid,

descending. No colitis noted-random biopsies from descending, sigmoid, rectum.  

Retroflexed views revealed internal hemorrhoid. Revealed external hemorrhoids.


Brief History - From Admission


From Admission H&P:





Is from patient, ER physician communication, interview of medical records.





Patient was sent from the nursing home because of 3 days duration of diarrhea 

which has number comes melena.


Patient herself is extremely poor historian.  She kept answering I do not 

remember to most of the questioning.


She is however states that she did have nausea vomiting and diarrhea.


She thinks that she has bleeding somewhere.  But she's not sure.


She reports that she is mostly bedbound at the nursing facility.


She relates not to give any proper history.


CBC/BMP:  


3/22/18 0619                                                                   

             3/24/18 0850





Significant Findings





Laboratory Tests








Test


  3/24/18


08:50


 


Blood Urea Nitrogen 4 MG/DL (7-18) 


 


Random Glucose


  110 MG/DL


()


 


Total Protein


  6.1 GM/DL


(6.4-8.2)


 


Albumin


  2.1 GM/DL


(3.4-5.0)


 


Calcium Level


  7.9 MG/DL


(8.5-10.1)


 


Alkaline Phosphatase


  196 U/L


()


 


Aspartate Amino Transf


(AST/SGOT) 78 U/L (15-37) 


 


 


Chloride Level


  109 MEQ/L


()








Imaging





Last Impressions








Liver Ultrasound 3/25/18 0000 Signed





Impressions: 





 Service Date/Time:  Sunday, March 25, 2018 15:22 - CONCLUSION: No acute 

disease. 





       Goyo Valente MD 


 


Hepatobiliary Scan Nuclear Medicine 3/25/18 0000 Signed





Impressions: 





 Service Date/Time:  Sunday, March 25, 2018 12:59 - CONCLUSION: No sign of 





 biliary obstruction.     Goyo Valente MD 


 


Abdomen/Pelvis CT 3/20/18 0000 Signed





Impressions: 





 Service Date/Time:  Tuesday, March 20, 2018 07:49 - CONCLUSION:  1. Diffuse 





 thickening of the wall the rectum, sigmoid colon, descending colon, splenic 





 flexure and distal transverse colon suggestive of diffuse colitis. Clinical 





 correlation is recommended.  2. Choledocholithiasis without definite 





 obstruction. Correlation with alkaline phosphatase and bilirubin levels is 





 suggested.  3. 8 mm calcified stone within the left renal pelvis without 





 definite acute obstructive uropathy.  4. Mild chronic prominence of the 





 pelvicalyceal systems and ureters with diffuse thickening of the right 





 collecting system, ureter and urinary bladder wall suggesting possible chronic 





 inflammation.  5. Stable right adrenal nodule measuring 1.8 x 1.1 cm.  6. 





 Diffuse pancreatic atrophy. 7. Stable right common iliac artery aneurysm 





 measuring 2.6 cm in greatest dimension.     Tian Hummel MD 








PE at Discharge


GENERAL: Well-nourished, well-developed pleasantly confused elderly female 

patient in NAD.


SKIN: Warm and dry. No rash.


HEENT:  Normocephalic. Atraumatic.Pupils equal and round. Mucous membranes pink 

and moist.


CARDIOVASCULAR: Regular rate and rhythm. No murmur appreciated. 


RESPIRATORY: No accessory muscle use. Clear to auscultation. Breath sounds 

equal bilaterally.  


GASTROINTESTINAL: Abdomen soft, nondistended, nontender today. Normoactive 

bowel sounds x4.


MUSCULOSKELETAL: No obvious deformities. Extremities without clubbing, cyanosis

, or edema. 


NEUROLOGICAL: Awake and alert. No obvious cranial nerve deficits.  Motor 

grossly within normal limits. Moving all extremities spontaneously. Normal 

speech.


PSYCHIATRIC: Appropriate mood and affect; insight and judgment limited.


Pt update on day of discharge


Follow up for abdominal pain, colitis, elevated LFTs. The patient is sleeping 

upon my arrival, easily awakens. Seen with Hermelinda RN at bedside. She has no 

medical complaints including no fevers/chills, abdominal pain, nausea/vomiting, 

or diarrhea. She is tolerating oral intake, although does not like the hospital 

food.


Hospital Course


86-year-old female with history of dementia, HTN, DM, CVA x2, COPD, presents 

from nursing home with 3 days of diarrhea and 1 day of rectal bleeding





Acute Colitis with Rectal Bleeding: CT abd/pelvis images reviewed, shows 

diffuse thickening of the wall the rectum, sigmoid colon, descending colon, 

splenic flexure and distal transverse colon suggestive of diffuse colitis. 

C.difficile negative and Stool cultures negative. +Leukocytosis with WBC 18K, 

lactic acid wnl. Given supportive treatment with IVF hydration w/NS +KCl @ 84cc/

hr, antiemetics prn, pain control prn. Given antibiotics with IV Cipro and 

Flagyl. GI consulted. S/p flex sigmoidoscopy 3/23 showed diverticulosis without 

colitis, +internal/external hemorrhoids. Hemoglobin stable at 11.6. No further 

bleeding. Symptoms much improved, no further abdominal pain, tolerating oral 

intake. 





Choledocholithiasis with Elevated LFTs: seen on CT, without definite 

obstruction. No evidence of jaundice. AST 91, , Alk Phos 139. GI 

consulted, patient unable to have MRCP due to pacer, however both HIDA and 

liver U/S unremarkable.  LFTs trended down. GI cleared for discharge. 





Nephrolithiasis/Urinary Retention: CT also showed 8 mm calcified stone within 

the left renal pelvis without definite acute obstructive uropathy; also mild 

chronic prominence of the pelvicalyceal systems and ureters with diffuse 

thickening of the right collecting system, ureter and urinary bladder wall 

suggesting possible chronic inflammation. Patient urinating spontaneously. 

Resolved. 





HEIKE on CKD stage III: Cr 1.19, previously 0.83 in Aug 2016. Suspect secondary 

to dehydration with recent diarrhea and poor oral intake. Given IVF with NS +

KCl at 84cc/hr. Monitor BMP, improving, Cr 0.56. Resolved. 





HTN/HLD: chronic. Continued patient's meds including metoprolol, cardizem, 

statin, niacin. Initially held lisinopril with HEIKE, now restarted. BP stable. 





Diabetes Mellitus: chronic. Continued patient's Levemir. Held patient's oral 

hypoglycemics including metformin during hospitalization. Monitor Accu-cheks 

and cover with low dose SSI. Stable. 





Dementia: chronic. Continued patient's Aricept. Return to nursing home at 

discharge


Pt Condition on Discharge:  Stable


Discharge Disposition:  Discharge to SNF


Discharge Time:  > 30 minutes


Discharge Instructions


DIET: Follow Instructions for:  Heart Healthy Diet


Activities you can perform:  Regular-No Restrictions


Follow up Referrals:  


Gastroenterology - 1 Week with Jolie Gonzalez MD


PCP Follow-up - 1 Week





New Medications:  


Ciprofloxacin (Cipro) 500 Mg Tab


500 MG PO BID for Infection for 4 Days, #8 TAB 0 Refills





Metronidazole (Flagyl) 500 Mg Tab


500 MG PO Q8HR for Infection for 4 Days, #12 TAB 0 Refills





 


Continued Medications:  


Acarbose (Acarbose) 50 Mg Tab


50 MG PO DAILY for Blood Sugar Management, #30 TAB 0 Refills





Atorvastatin (Atorvastatin) 20 Mg Tab


20 MG PO HS for Cholesterol Management, #30 TAB 0 Refills





Bisacodyl Supp (Biscolax Supp) 10 Mg Supp


10 MG RECTAL DAILY PRN for CONSTIPATION, SUPP 0 Refills





Cholecalciferol (Vitamin D-3) 2,000 Unit Tab


93178 TUESDAY





Coal Tar (Crude) Topical (Coal Tar (Crude) Topical) 20% Soln


1 APPLIC TOPICAL AS DIRECTED, #100 ML 0 Refills





Dextromethorphan (Robafen Cough) 15 Mg Cap


10 MG PO Q4HR PRN for COUGH, CAP 0 Refills





Dextrose Gel (Glutose 15 Gel) 40 % Gel


1 TUBE AS DIRECTED for Blood Sugar Management, #1 TUBE 0 Refills





Diltiazem ER 24 HR (Diltiazem ER 24 HR) 240 Mg Nicole


240 MG PO DAILY, #30 TAB 0 Refills





Docusate Sodium (Docusate Sodium) 100 Mg Cap


100 MG PO BID PRN for CONSTIPATION, #60 CAP 0 Refills





Donepezil (Donepezil) 10 Mg Tab


10 MG PO HS for Dementia, #30 TAB 0 Refills





Famotidine (Famotidine) 20 Mg Tab


20 MG PO DAILY, #60 TAB 0 Refills





Gabapentin (Gabapentin) 100 Mg Cap


100 MG PO HS, #30 CAP 0 Refills





Glucagon (Rdna) Inj Kit (Glucagen Hypokit Inj Kit) 1 Mg Kit


1 MG IM ONCE PRN for Blood Sugar Management, #1 KIT 0 Refills





Hydrocodone-Acetaminophen (Norco) 5 Mg-325 Mg Tab


1 TAB PO Q6H PRN for PAIN, TAB 0 Refills





Insulin Aspart Inj (Novolog Inj) 1,000 Unit/10 Ml Vial


0 SQ AS DIRECTED for Blood Sugar Management, #10 ML 0 Refills


Sliding Scale as directed.


Insulin Detemir Inj (Levemir Inj) 1,000 unit/ 10 ML Vial


22 UNITS SQ DAILY for Blood Sugar Management, VIAL 0 Refills


Do not mix with any other Insulin.


Insulin Detemir Inj (Levemir Inj) 1,000 unit/ 10 ML Vial


30 UNITS SQ HS for Blood Sugar Management, VIAL 0 Refills


Do not mix with any other Insulin.


Isosorbide Mononitrate ER (Isosorbide Mononitrate ER) 60 Mg Tab


60 MG PO DAILY for Prevent Chest Pain, #30 TAB 0 Refills





Levothyroxine (Levothyroxine) 25 Mcg Tab


25 MCG PO DAILY for Thyroid, #30 TAB 0 Refills





Lisinopril (Lisinopril) 10 Mg Tab


10 MG PO DAILY, #30 TAB 0 Refills





Metformin (Metformin) 850 Mg Tab


850 MG PO BIDPC for Blood Sugar Management, TAB 0 Refills





Metoprolol Tartrate (Metoprolol Tartrate) 100 Mg Tab


100 MG PO BID, #60 TAB 0 Refills





Niacin (Niacin) 100 Mg Tab


250 MG PO HS for Nutritional Supplement, #30 TAB 0 Refills





Polyethylene Glycol 3350 Powder (Polyethylene Glycol 3350 Powder) 17 Gram Pow


17 GM PO DAILY for Constipation, #1 BOTTLE 0 Refills

















Rocio Valdez PA-C Mar 26, 2018 10:14 am

## 2018-03-27 VITALS
RESPIRATION RATE: 17 BRPM | DIASTOLIC BLOOD PRESSURE: 63 MMHG | SYSTOLIC BLOOD PRESSURE: 131 MMHG | TEMPERATURE: 98.4 F | HEART RATE: 72 BPM | OXYGEN SATURATION: 96 %

## 2018-03-27 VITALS
DIASTOLIC BLOOD PRESSURE: 77 MMHG | SYSTOLIC BLOOD PRESSURE: 184 MMHG | OXYGEN SATURATION: 97 % | HEART RATE: 66 BPM | RESPIRATION RATE: 20 BRPM | TEMPERATURE: 98.4 F

## 2018-03-27 VITALS
OXYGEN SATURATION: 95 % | DIASTOLIC BLOOD PRESSURE: 79 MMHG | HEART RATE: 65 BPM | SYSTOLIC BLOOD PRESSURE: 186 MMHG | RESPIRATION RATE: 16 BRPM | TEMPERATURE: 97.9 F

## 2018-03-27 LAB
ALBUMIN SERPL-MCNC: 2.2 GM/DL (ref 3.4–5)
ALP SERPL-CCNC: 129 U/L (ref 45–117)
ALT SERPL-CCNC: 21 U/L (ref 10–53)
AST SERPL-CCNC: 18 U/L (ref 15–37)
BILIRUB SERPL-MCNC: 0.3 MG/DL (ref 0.2–1)
BUN SERPL-MCNC: 3 MG/DL (ref 7–18)
CALCIUM SERPL-MCNC: 7.8 MG/DL (ref 8.5–10.1)
CHLORIDE SERPL-SCNC: 107 MEQ/L (ref 98–107)
CREAT SERPL-MCNC: 0.88 MG/DL (ref 0.5–1)
ERYTHROCYTE [DISTWIDTH] IN BLOOD BY AUTOMATED COUNT: 14.5 % (ref 11.6–17.2)
GFR SERPLBLD BASED ON 1.73 SQ M-ARVRAT: 61 ML/MIN (ref 89–?)
GLUCOSE SERPL-MCNC: 190 MG/DL (ref 74–106)
HCO3 BLD-SCNC: 23.9 MEQ/L (ref 21–32)
HCT VFR BLD CALC: 37.7 % (ref 35–46)
HGB BLD-MCNC: 12.5 GM/DL (ref 11.6–15.3)
MCH RBC QN AUTO: 29 PG (ref 27–34)
MCHC RBC AUTO-ENTMCNC: 33.1 % (ref 32–36)
MCV RBC AUTO: 87.5 FL (ref 80–100)
PLATELET # BLD: 301 TH/MM3 (ref 150–450)
PMV BLD AUTO: 8.8 FL (ref 7–11)
PROT SERPL-MCNC: 6.1 GM/DL (ref 6.4–8.2)
RBC # BLD AUTO: 4.31 MIL/MM3 (ref 4–5.3)
SODIUM SERPL-SCNC: 139 MEQ/L (ref 136–145)
WBC # BLD AUTO: 11.2 TH/MM3 (ref 4–11)

## 2018-03-27 RX ADMIN — CIPROFLOXACIN SCH MLS/HR: 2 INJECTION, SOLUTION INTRAVENOUS at 08:31

## 2018-03-27 RX ADMIN — INSULIN ASPART SCH: 100 INJECTION, SOLUTION INTRAVENOUS; SUBCUTANEOUS at 21:00

## 2018-03-27 RX ADMIN — ISOSORBIDE MONONITRATE SCH MG: 60 TABLET, EXTENDED RELEASE ORAL at 06:07

## 2018-03-27 RX ADMIN — Medication SCH MG: at 21:00

## 2018-03-27 RX ADMIN — FAMOTIDINE SCH MG: 20 TABLET, FILM COATED ORAL at 09:00

## 2018-03-27 RX ADMIN — DONEPEZIL HYDROCHLORIDE SCH MG: 5 TABLET, FILM COATED ORAL at 20:33

## 2018-03-27 RX ADMIN — SODIUM CHLORIDE SCH MLS/HR: 900 INJECTION, SOLUTION INTRAVENOUS at 00:41

## 2018-03-27 RX ADMIN — ACYCLOVIR SCH UNITS: 800 TABLET ORAL at 08:34

## 2018-03-27 RX ADMIN — SODIUM CHLORIDE SCH MLS/HR: 900 INJECTION, SOLUTION INTRAVENOUS at 04:36

## 2018-03-27 RX ADMIN — INSULIN ASPART SCH: 100 INJECTION, SOLUTION INTRAVENOUS; SUBCUTANEOUS at 08:00

## 2018-03-27 RX ADMIN — INSULIN ASPART SCH: 100 INJECTION, SOLUTION INTRAVENOUS; SUBCUTANEOUS at 12:00

## 2018-03-27 RX ADMIN — PANTOPRAZOLE SODIUM SCH MG: 40 INJECTION, POWDER, FOR SOLUTION INTRAVENOUS at 18:42

## 2018-03-27 RX ADMIN — SODIUM CHLORIDE AND POTASSIUM CHLORIDE SCH MLS/HR: 9; 1.49 INJECTION, SOLUTION INTRAVENOUS at 08:31

## 2018-03-27 RX ADMIN — LEVOTHYROXINE SODIUM SCH MCG: 25 TABLET ORAL at 06:00

## 2018-03-27 RX ADMIN — ATORVASTATIN CALCIUM SCH MG: 20 TABLET, FILM COATED ORAL at 20:33

## 2018-03-27 RX ADMIN — ACYCLOVIR SCH UNITS: 800 TABLET ORAL at 20:35

## 2018-03-27 RX ADMIN — INSULIN ASPART SCH: 100 INJECTION, SOLUTION INTRAVENOUS; SUBCUTANEOUS at 17:00

## 2018-03-27 RX ADMIN — DILTIAZEM HYDROCHLORIDE SCH MG: 240 CAPSULE, EXTENDED RELEASE ORAL at 09:00

## 2018-03-27 RX ADMIN — LISINOPRIL SCH MG: 10 TABLET ORAL at 09:00

## 2018-03-27 RX ADMIN — GABAPENTIN SCH MG: 100 CAPSULE ORAL at 20:34

## 2018-03-27 RX ADMIN — SODIUM CHLORIDE SCH MLS/HR: 900 INJECTION, SOLUTION INTRAVENOUS at 22:53

## 2018-03-27 RX ADMIN — CIPROFLOXACIN SCH MLS/HR: 2 INJECTION, SOLUTION INTRAVENOUS at 20:33

## 2018-03-27 RX ADMIN — METOPROLOL TARTRATE SCH MG: 100 TABLET, FILM COATED ORAL at 20:34

## 2018-03-27 RX ADMIN — PANTOPRAZOLE SODIUM SCH MG: 40 INJECTION, POWDER, FOR SOLUTION INTRAVENOUS at 06:00

## 2018-03-27 RX ADMIN — Medication SCH ML: at 09:00

## 2018-03-27 RX ADMIN — Medication SCH ML: at 20:49

## 2018-03-27 RX ADMIN — SODIUM CHLORIDE SCH MLS/HR: 900 INJECTION, SOLUTION INTRAVENOUS at 09:52

## 2018-03-27 RX ADMIN — METOPROLOL TARTRATE SCH MG: 100 TABLET, FILM COATED ORAL at 09:00

## 2018-03-27 RX ADMIN — SODIUM CHLORIDE SCH MLS/HR: 900 INJECTION, SOLUTION INTRAVENOUS at 17:32

## 2018-03-27 NOTE — HHI.PR
Subjective


Remarks


Patient seen in preop holding.  Says she feels all right.  Denies any chest 

pain or shortness of breath.  Denies any nausea or vomiting





Objective





Vital Signs








  Date Time  Temp Pulse Resp B/P (MAP) Pulse Ox O2 Delivery O2 Flow Rate FiO2


 


3/27/18 11:18 97.9 65 16 186/79 (114) 95   


 


3/27/18 07:47 98.4 66 20 184/77 (112) 97   


 


3/27/18 03:31 98.4 72 17 131/63 (85) 96   


 


3/26/18 23:29 98.4 63 18 144/65 (91) 96   


 


3/26/18 21:04 98.6 81 17 135/63 (87) 93   














I/O      


 


 3/26/18 3/26/18 3/26/18 3/27/18 3/27/18 3/27/18





 07:00 15:00 23:00 07:00 15:00 23:00


 


Intake Total  200 ml   200 ml 


 


Balance  200 ml   200 ml 


 


      


 


IV Total  200 ml   200 ml 


 


# Voids 2     








Result Diagram:  


3/27/18 1105                                                                   

             3/27/18 1105





Objective Remarks


GENERAL: Patient lying in bed.  Appears comfortable.


SKIN: Warm and dry.


HEAD: Normocephalic.


EYES: No scleral icterus. No injection or drainage. 


NECK: Supple, trachea midline. No JVD.


CARDIOVASCULAR: Regular rate and rhythm without murmurs, gallops, or rubs. 


RESPIRATORY: Breath sounds equal bilaterally. No accessory muscle use.


GASTROINTESTINAL: Abdomen soft, non-tender, nondistended. 


MUSCULOSKELETAL: No cyanosis, or edema. 


BACK: Nontender without obvious deformity. No CVA tenderness.








A/P


Assessment and Plan





86-year-old female with history of dementia, HTN, DM, CVA x2, COPD, presents 

from nursing home with 3 days of diarrhea and 1 day of rectal bleeding





//Acute Colitis: CT abd/pelvis images reviewed, shows diffuse thickening of the 

wall the rectum, sigmoid colon, descending colon, splenic flexure and distal 

transverse colon suggestive of diffuse colitis. 


   -C.difficile negative and Stool cultures negative


   -+Leukocytosis with WBC 18K, lactic acid wnl


   -Supportive treatment with IVF hydration w/NS +KCl @ 84cc/hr, antiemetics prn

, pain control prn


   -Continue antibiotics with IV Cipro and Flagyl


   -advance diet as tolerated


   -S/p flex sigmoidoscopy 3/23 showed diverticulosis without colitis, +internal

/external hemorrhoids


   -GI Consulted, appreciate assistance


   -symptoms improving, no further abdominal pain, tolerating oral intake





//Rectal Bleeding: suspect secondary to colitis as above. 


   -Hgb stable at 13.6, continue to monitor


   -GI consulted, flex sig showed internal and external hemorrhoids, likely 

source of bleed


   -no further bleeding noted, Hgb stable at 11.6





//Choledocholithiasis: seen on CT, without definite obstruction. No evidence of 

jaundice. AST 91, , Alk Phos 139. 


   -LFTs trending down however still elevated


   -GI consulted as above, ordered MRCP however unable to have MRCP due to pacer


   -HIDA and Liver U/S unremarkable


   -GI recommending EUS with possible ERCP tomorrow


= 3/27.  Alkaline phosphatase continues elevated.  Awaiting results of EUS, 

possible ERCP.





//Nephrolithiasis/Urinary Retention: CT also showed 8 mm calcified stone within 

the left renal pelvis without definite acute obstructive uropathy; also mild 

chronic prominence of the pelvicalyceal systems and ureters with diffuse 

thickening of the right collecting system, ureter and urinary bladder wall 

suggesting possible chronic inflammation. 


   -monitor urine output for now


   -patient urinating spontaneously





//HEIKE on CKD stage III: Cr 1.19, previously 0.83 in Aug 2016. Suspect secondary 

to dehydration with recent diarrhea and poor oral intake. 


   -give IVF with NS +KCl at 84cc/hr


   -avoid nephrotoxins


   -monitor BMP, improved





//HTN/HLD: chronic


   -continue patient's meds including metoprolol, cardizem, statin, niacin


   -initially held lisinopril with HEIKE, now restarted


   -monitor BP, adjust antihypertensives as needed





//Diabetes Mellitus: chronic


   -continue patient's Levemir


   -hold patient's oral hypoglycemics including metformin


   -monitor Accu-cheks and cover with low dose SSI





//Dementia: chronic


   -continue patient's Aricept


   -will return to [facility] at discharge





All other medical conditions stable, continue home medications as appropriate. 





DVT Prophylaxis: teds/SCDs; hold chemoprophylaxis with GI bleeding as above


Discharge Planning


 EUS with possible ERCP today. 


Not yet cleared for discharge.  Will need to return to [facility] at discharge.











Jv Eller MD Mar 27, 2018 15:29

## 2018-03-27 NOTE — PD.PROCEDR
GI Procedure





PROCEDURE PERFORMED


EUS followed by an ERCP with sphincterotomy balloon extraction and stent 

placement





INDICATION FOR PROCEDURE


Suspect choledocholithiasis





PROCEDURE:


The procedure, risks and benefits were discussed with Ms. Mishra and informed


consent was obtained.  Anesthesia sedated her with Diprivan.  She was placed in 

the left lateral decubitus position.





Endoscopic ultrasound:


The Pentax videoscope was introduced through the oropharynx and advanced to the 

second portion of the duodenum.





FINDINGS:


The bile duct appeared to be of normal caliber but filling defects were noted 

within the common bile duct confirming the presence of choledocholithiasis





EUS:


The Pentax videoscope was introduced through the oropharynx and advanced to the 

second portion of the duodenum .





FINDINGS:


The ampulla appeared to be unremarkable and within normal limits


We were able to obtain easy cannulation of the common bile duct that appeared 

to be of normal caliber with filling defects one of the filling defects was way 

up high in the left hepatic system a sphincterotomy was performed and then 

using the 8 mm and 12 mm balloons we were able to extract the filling defects 

from the common bile duct but I was unable to bring down the stone or the 

filling defect stuck just proximal to the bifurcation in the left hepatic duct 

and so a 10 English 7 cm stent was placed in hopes that this stone will make its 

way down








ESTIMATED BLOOD LOSS:


None





SPECIMENS REMOVED:


None





COMPLICATIONS:


None





IMPRESSION:


Choledocholithiasis





PLAN:


Continue with current supportive care


Monitor labs


ERCP in 3 months to remove stent











Gordo Breaux MD Mar 27, 2018 17:14

## 2018-03-28 VITALS
SYSTOLIC BLOOD PRESSURE: 153 MMHG | RESPIRATION RATE: 18 BRPM | HEART RATE: 60 BPM | OXYGEN SATURATION: 93 % | DIASTOLIC BLOOD PRESSURE: 69 MMHG | TEMPERATURE: 98.3 F

## 2018-03-28 VITALS
DIASTOLIC BLOOD PRESSURE: 83 MMHG | TEMPERATURE: 98 F | OXYGEN SATURATION: 97 % | SYSTOLIC BLOOD PRESSURE: 211 MMHG | RESPIRATION RATE: 22 BRPM | HEART RATE: 60 BPM

## 2018-03-28 VITALS — RESPIRATION RATE: 16 BRPM

## 2018-03-28 LAB
A1AT SERPL-MCNC: 165 MG/DL (ref 100–190)
BASOPHILS # BLD AUTO: 0.1 TH/MM3 (ref 0–0.2)
BASOPHILS NFR BLD: 0.7 % (ref 0–2)
EOSINOPHIL # BLD: 0.2 TH/MM3 (ref 0–0.4)
EOSINOPHIL NFR BLD: 1.4 % (ref 0–4)
ERYTHROCYTE [DISTWIDTH] IN BLOOD BY AUTOMATED COUNT: 15.2 % (ref 11.6–17.2)
HCT VFR BLD CALC: 43.2 % (ref 35–46)
HGB BLD-MCNC: 14.3 GM/DL (ref 11.6–15.3)
LYMPHOCYTES # BLD AUTO: 1.6 TH/MM3 (ref 1–4.8)
LYMPHOCYTES NFR BLD AUTO: 11.1 % (ref 9–44)
MCH RBC QN AUTO: 29 PG (ref 27–34)
MCHC RBC AUTO-ENTMCNC: 33 % (ref 32–36)
MCV RBC AUTO: 87.9 FL (ref 80–100)
MONOCYTE #: 1 TH/MM3 (ref 0–0.9)
MONOCYTES NFR BLD: 7.3 % (ref 0–8)
NEUTROPHILS # BLD AUTO: 11.3 TH/MM3 (ref 1.8–7.7)
NEUTROPHILS NFR BLD AUTO: 79.5 % (ref 16–70)
PLATELET # BLD: 278 TH/MM3 (ref 150–450)
PMV BLD AUTO: 9.2 FL (ref 7–11)
RBC # BLD AUTO: 4.92 MIL/MM3 (ref 4–5.3)
WBC # BLD AUTO: 14.3 TH/MM3 (ref 4–11)

## 2018-03-28 RX ADMIN — FAMOTIDINE SCH MG: 20 TABLET, FILM COATED ORAL at 10:56

## 2018-03-28 RX ADMIN — LEVOTHYROXINE SODIUM SCH MCG: 25 TABLET ORAL at 06:00

## 2018-03-28 RX ADMIN — SODIUM CHLORIDE AND POTASSIUM CHLORIDE SCH MLS/HR: 9; 1.49 INJECTION, SOLUTION INTRAVENOUS at 10:54

## 2018-03-28 RX ADMIN — CIPROFLOXACIN SCH MLS/HR: 2 INJECTION, SOLUTION INTRAVENOUS at 10:57

## 2018-03-28 RX ADMIN — ISOSORBIDE MONONITRATE SCH MG: 60 TABLET, EXTENDED RELEASE ORAL at 10:54

## 2018-03-28 RX ADMIN — SODIUM CHLORIDE SCH MLS/HR: 900 INJECTION, SOLUTION INTRAVENOUS at 02:00

## 2018-03-28 RX ADMIN — LISINOPRIL SCH MG: 10 TABLET ORAL at 10:55

## 2018-03-28 RX ADMIN — PANTOPRAZOLE SODIUM SCH MG: 40 INJECTION, POWDER, FOR SOLUTION INTRAVENOUS at 06:00

## 2018-03-28 RX ADMIN — INSULIN ASPART SCH: 100 INJECTION, SOLUTION INTRAVENOUS; SUBCUTANEOUS at 14:32

## 2018-03-28 RX ADMIN — METOPROLOL TARTRATE SCH MG: 100 TABLET, FILM COATED ORAL at 10:57

## 2018-03-28 NOTE — HHI.PR
Subjective


Remarks


Patient says she is feeling all right.  Denies any chest pain or shortness of 

breath.  Denies any nausea or vomiting.





Discussed with nursing.  Patient has refused all blood pressure meds.  I 

discussed with patient, and she will take her blood pressure meds if she gets a 

cup of decaf coffee.





Objective





Vital Signs








  Date Time  Temp Pulse Resp B/P (MAP) Pulse Ox O2 Delivery O2 Flow Rate FiO2


 


3/28/18 08:00 98.0 60 22 211/83 (125) 97   


 


3/28/18 00:29   16     


 


3/27/18 16:44 97.5 77 20 148/77 (100) 94   


 


3/27/18 11:18 97.9 65 16 186/79 (114) 95   














I/O      


 


 3/27/18 3/27/18 3/27/18 3/28/18 3/28/18 3/28/18





 07:00 15:00 23:00 07:00 15:00 23:00


 


Intake Total  200 ml 400 ml   


 


Balance  200 ml 400 ml   


 


      


 


IV Total  200 ml    


 


Other   400 ml   








Result Diagram:  


3/27/18 1105                                                                   

             3/27/18 1105





Objective Remarks


GENERAL: Patient lying in bed.  Appears comfortable. no change on exam.


SKIN: Warm and dry.


HEAD: Normocephalic.


EYES: No scleral icterus. No injection or drainage. 


NECK: Supple, trachea midline. No JVD.


CARDIOVASCULAR: Regular rate and rhythm without murmurs, gallops, or rubs. 


RESPIRATORY: Breath sounds equal bilaterally. No accessory muscle use.


GASTROINTESTINAL: Abdomen soft, non-tender, nondistended. 


MUSCULOSKELETAL: No cyanosis, or edema. 


BACK: Nontender without obvious deformity. No CVA tenderness.








A/P


Assessment and Plan





86-year-old female with history of dementia, HTN, DM, CVA x2, COPD, presents 

from nursing home with 3 days of diarrhea and 1 day of rectal bleeding





//Acute Colitis: CT abd/pelvis images reviewed, shows diffuse thickening of the 

wall the rectum, sigmoid colon, descending colon, splenic flexure and distal 

transverse colon suggestive of diffuse colitis. 


   -C.difficile negative and Stool cultures negative


   -+Leukocytosis with WBC 18K, lactic acid wnl


   -Supportive treatment with IVF hydration w/NS +KCl @ 84cc/hr, antiemetics prn

, pain control prn


   -Continue antibiotics with IV Cipro and Flagyl


   -advance diet as tolerated


   -S/p flex sigmoidoscopy 3/23 showed diverticulosis without colitis, +internal

/external hemorrhoids


   -GI Consulted, appreciate assistance


   -symptoms improving, no further abdominal pain, tolerating oral intake





//Rectal Bleeding: suspect secondary to colitis as above. 


   -Hgb stable at 13.6, continue to monitor


   -GI consulted, flex sig showed internal and external hemorrhoids, likely 

source of bleed


   -no further bleeding noted, Hgb stable at 11.6





//Choledocholithiasis: seen on CT, without definite obstruction. No evidence of 

jaundice. AST 91, , Alk Phos 139. 


   -LFTs trending down however still elevated


   -GI consulted as above, ordered MRCP however unable to have MRCP due to pacer


   -HIDA and Liver U/S unremarkable


   -GI recommending EUS with possible ERCP tomorrow


= 3/27.  Alkaline phosphatase continues elevated.  Awaiting results of EUS, 

possible ERCP.


= 3/28.  Patient had stenting of biliary duct yesterday.  Will need repeat ERCP 

in 3 months with GI.





//Nephrolithiasis/Urinary Retention: CT also showed 8 mm calcified stone within 

the left renal pelvis without definite acute obstructive uropathy; also mild 

chronic prominence of the pelvicalyceal systems and ureters with diffuse 

thickening of the right collecting system, ureter and urinary bladder wall 

suggesting possible chronic inflammation. 


   -monitor urine output for now


   -patient urinating spontaneously





//HEIKE on CKD stage III: Cr 1.19, previously 0.83 in Aug 2016. Suspect secondary 

to dehydration with recent diarrhea and poor oral intake. 


   -give IVF with NS +KCl at 84cc/hr


   -avoid nephrotoxins


   -monitor BMP, improved





//HTN/HLD: chronic


   -continue patient's meds including metoprolol, cardizem, statin, niacin


   -initially held lisinopril with HEIKE, now restarted


   -monitor BP, adjust antihypertensives as needed


= 3/28.  Accelerated hypertension.  Blood pressure in the 200s.  Will except 

medications exchanged for coffee.  Discussed with nursing.  Appreciate 

assistance.





//Diabetes Mellitus: chronic


   -continue patient's Levemir


   -hold patient's oral hypoglycemics including metformin


   -monitor Accu-cheks and cover with low dose SSI





//Dementia: chronic


   -continue patient's Aricept


   -will return to [facility] at discharge





All other medical conditions stable, continue home medications as appropriate. 





DVT Prophylaxis: teds/SCDs; hold chemoprophylaxis with GI bleeding as above


Discharge Planning


High blood pressure today.  Pending morning meds.


= Hopefully can discharge later today if blood pressure acceptable.


= Will need follow with GI, and repeat ERCP in 3 months.


Not yet cleared for discharge.  Will need to return to [facility] 

[facility] at discharge.











Jv Eller MD Mar 28, 2018 10:30

## 2018-03-28 NOTE — PD.WCN.NOT
Wound Consult


Description:


Wound consult ordered by Dr.Duncan ABAD for sacral and heels


Communicated with:


Robbin MONTERROSO G-pod, 


Recommendation:


1. Turn patient every 2 hours for comfort and offloading.


2. Provide patient with external female catheter for incontinence


3. Cleanse buttocks with warm water pat dry


4. Apply Calazime cream to inner buttocks/sacrum BID or as needed for 

incontinence.


5. Apply skin prep to bilateral calcaneus then apply heel protectors when 

patient in bed.


Additional Information:


Patient was seen today in G-pod by writer.Writer attempted to see patient 3/27 

@ 1350 but was off floor.Patient alert to self in bed with heel protectors in 

place.Patient is incontinent of bladder UltraSorb underpad soaked upon writers 

arrival. Pericare performed and patient was repositioned to right side with the 

assistance of Robbin MONTERROSO.Buttocks was visualized by writer and assessment 

finding are as follows.Patient has 2 partial thickness denuded areas in which 

are mixed etiology of moisture/friction.Wound base are moist pink tissue with 

no drainage or odor noted.Wound edges are well defined and even with wound 

base.Periwound and surrounding tissue blanchable to touch.Buttocks cleansed 

with normal saline pat dry Calazime cream applied in 2mm thick layer.Patient 

was repositioned off of left side for comfort.Heel inspected blanchable with no 

areas of concern at this time skin prep applied to bilateral calcaneus and heel 

protectors re applied.











Ginette Walker [facility] Mar 28, 2018 15:29

## 2018-03-28 NOTE — HHI.DS
__________________________________________________





Discharge Summary


Admission Date


Mar 20, 2018 at 01:23


Discharge Date:  Mar 28, 2018


Admitting Diagnosis





lower GI bleed, possible C. difficile colitis, leukocytosis





(1) Colitis


ICD Code:  K52.9 - Noninfective gastroenteritis and colitis, unspecified


(2) Internal and external hemorrhoids without complication


ICD Code:  K64.4 - Residual hemorrhoidal skin tags; K64.8 - Other hemorrhoids


(3) LFTs abnormal


ICD Code:  R94.5 - Abnormal results of liver function studies


(4) Diabetes mellitus type 2 in obese


ICD Code:  E11.9 - Diabetes mellitus type 2 in obese; E66.9 - Obesity, 

unspecified


Status:  Acute


(5) Lower GI bleed


ICD Code:  K92.2 - Gastrointestinal hemorrhage, unspecified


Status:  Acute


(6) Hypertension


ICD Code:  I10 - Hypertension


Status:  Acute


(7) Hyperlipidemia


ICD Code:  E78.5 - Hyperlipidemia


Status:  Acute


(8) Dehydration


ICD Code:  E86.0 - Dehydration


(9) Dementia


ICD Code:  F03.90 - Dementia


Status:  Acute


Procedures


3/23 - Flex Sigmoidoscopy by Dr. Gonzalez - Showed Diverticulosis sigmoid,

descending. No colitis noted-random biopsies from descending, sigmoid, rectum.  

Retroflexed views revealed internal hemorrhoid. Revealed external hemorrhoids.


Brief History - From Admission


From Admission H&P:





Is from patient, ER physician communication, interview of medical records.





Patient was sent from the nursing home because of 3 days duration of diarrhea 

which has number comes melena.


Patient herself is extremely poor historian.  She kept answering I do not 

remember to most of the questioning.


She is however states that she did have nausea vomiting and diarrhea.


She thinks that she has bleeding somewhere.  But she's not sure.


She reports that she is mostly bedbound at the nursing facility.


She relates not to give any proper history.


CBC/BMP:  


3/28/18 1133                                                                   

             3/27/18 1105





Significant Findings





Laboratory Tests








Test


  3/26/18


11:15 3/26/18


19:43 3/27/18


11:05 3/28/18


11:33


 


Monocytes (%) (Auto)


  9.1 %


(0.0-8.0) 


  


  


 


 


Monocytes # (Auto)


  1.0 TH/MM3


(0-0.9) 


  


  1.0 TH/MM3


(0-0.9)


 


Blood Urea Nitrogen 3 MG/DL (7-18)   3 MG/DL (7-18)  


 


Random Glucose


  294 MG/DL


() 


  190 MG/DL


() 


 


 


Total Protein


  5.6 GM/DL


(6.4-8.2) 


  6.1 GM/DL


(6.4-8.2) 


 


 


Albumin


  2.1 GM/DL


(3.4-5.0) 


  2.2 GM/DL


(3.4-5.0) 


 


 


Calcium Level


  7.7 MG/DL


(8.5-10.1) 


  7.8 MG/DL


(8.5-10.1) 


 


 


Alkaline Phosphatase


  132 U/L


() 


  129 U/L


() 


 


 


Potassium Level


  3.4 MEQ/L


(3.5-5.1) 


  


  


 


 


Chloride Level


  111 MEQ/L


() 


  


  


 


 


Estimat Glomerular Filtration


Rate 59 ML/MIN


(>89) 


  61 ML/MIN


(>89) 


 


 


Iron Level


  


  46 MCG/DL


() 


  


 


 


Total Iron Binding Capacity


  


  231 MCG/DL


(250-450) 


  


 


 


Percent Iron Saturation  19.9 % (20-50)   


 


White Blood Count


  


  


  11.2 TH/MM3


(4.0-11.0) 14.3 TH/MM3


(4.0-11.0)


 


Neutrophils (%) (Auto)


  


  


  


  79.5 %


(16.0-70.0)


 


Neutrophils # (Auto)


  


  


  


  11.3 TH/MM3


(1.8-7.7)








PE at Discharge


GENERAL: Well-nourished, well-developed pleasantly confused elderly female 

patient in NAD.


SKIN: Warm and dry. No rash.


HEENT:  Normocephalic. Atraumatic.Pupils equal and round. Mucous membranes pink 

and moist.


CARDIOVASCULAR: Regular rate and rhythm. No murmur appreciated. 


RESPIRATORY: No accessory muscle use. Clear to auscultation. Breath sounds 

equal bilaterally.  


GASTROINTESTINAL: Abdomen soft, nondistended, nontender today. Normoactive 

bowel sounds x4.


MUSCULOSKELETAL: No obvious deformities. Extremities without clubbing, cyanosis

, or edema. 


NEUROLOGICAL: Awake and alert. No obvious cranial nerve deficits.  Motor 

grossly within normal limits. Moving all extremities spontaneously. Normal 

speech.


PSYCHIATRIC: Appropriate mood and affect; insight and judgment limited.


Hospital Course





86-year-old female with history of dementia, HTN, DM, CVA x2, COPD, presents 

from nursing home with 3 days of diarrhea and 1 day of rectal bleeding





//Acute Colitis: CT abd/pelvis images reviewed, shows diffuse thickening of the 

wall the rectum, sigmoid colon, descending colon, splenic flexure and distal 

transverse colon suggestive of diffuse colitis. 


   -C.difficile negative and Stool cultures negative


   -+Leukocytosis with WBC 18K, lactic acid wnl


   -Supportive treatment with IVF hydration w/NS +KCl @ 84cc/hr, antiemetics prn

, pain control prn


   -Continue antibiotics with IV Cipro and Flagyl


   -advance diet as tolerated


   -S/p flex sigmoidoscopy 3/23 showed diverticulosis without colitis, +internal

/external hemorrhoids


   -GI Consulted, appreciate assistance


   -symptoms improving, no further abdominal pain, tolerating oral intake





//Rectal Bleeding: suspect secondary to colitis as above. 


   -Hgb stable at 13.6, continue to monitor


   -GI consulted, flex sig showed internal and external hemorrhoids, likely 

source of bleed


   -no further bleeding noted, Hgb stable at 11.6





//Choledocholithiasis: seen on CT, without definite obstruction. No evidence of 

jaundice. AST 91, , Alk Phos 139. 


   -LFTs trending down however still elevated


   -GI consulted as above, ordered MRCP however unable to have MRCP due to pacer


   -HIDA and Liver U/S unremarkable


   -GI recommending EUS with possible ERCP tomorrow


= 3/27.  Alkaline phosphatase continues elevated.  Awaiting results of EUS, 

possible ERCP.


= 3/28.  Patient had stenting of biliary duct yesterday.  Will need repeat ERCP 

in 3 months with GI.





//Nephrolithiasis/Urinary Retention: CT also showed 8 mm calcified stone within 

the left renal pelvis without definite acute obstructive uropathy; also mild 

chronic prominence of the pelvicalyceal systems and ureters with diffuse 

thickening of the right collecting system, ureter and urinary bladder wall 

suggesting possible chronic inflammation. 


   -monitor urine output for now


   -patient urinating spontaneously





//HEIKE on CKD stage III: Cr 1.19, previously 0.83 in Aug 2016. Suspect secondary 

to dehydration with recent diarrhea and poor oral intake. 


   -give IVF with NS +KCl at 84cc/hr


   -avoid nephrotoxins


   -monitor BMP, improved





//HTN/HLD: chronic


   -continue patient's meds including metoprolol, cardizem, statin, niacin


   -initially held lisinopril with HEIKE, now restarted


   -monitor BP, adjust antihypertensives as needed


= 3/28.  Accelerated hypertension.  Blood pressure in the 200s.  Will except 

medications exchanged for coffee.  Discussed with nursing.  Appreciate 

assistance.





//Diabetes Mellitus: chronic


   -continue patient's Levemir


   -hold patient's oral hypoglycemics including metformin


   -monitor Accu-cheks and cover with low dose SSI





//Dementia: chronic


   -continue patient's Aricept


   -will return to [facility] at discharge





All other medical conditions stable, continue home medications as appropriate. 





DVT Prophylaxis: teds/SCDs; hold chemoprophylaxis with GI bleeding as above


Discharge Planning


High blood pressure today.  Pending morning meds.


= Hopefully can discharge later today if blood pressure acceptable.


= Will need follow with GI, and repeat ERCP in 3 months.


Not yet cleared for discharge.  Will need to return to [facility] 

[facility] at discharge.


Pt Condition on Discharge:  Stable


Discharge Disposition:  Discharge to SNF


Discharge Time:  > 30 minutes


Discharge Instructions


DIET: Follow Instructions for:  Heart Healthy Diet


Activities you can perform:  Regular-No Restrictions


Follow up Referrals:  


Gastroenterology - 1 Week with Jolie Gonzalez MD


PCP Follow-up - 1 Week





New Medications:  


Ciprofloxacin (Cipro) 500 Mg Tab


500 MG PO BID for Infection for 4 Days, #8 TAB 0 Refills





Metronidazole (Flagyl) 500 Mg Tab


500 MG PO Q8HR for Infection for 4 Days, #12 TAB 0 Refills





 


Continued Medications:  


Acarbose (Acarbose) 50 Mg Tab


50 MG PO DAILY for Blood Sugar Management, #30 TAB 0 Refills





Atorvastatin (Atorvastatin) 20 Mg Tab


20 MG PO HS for Cholesterol Management, #30 TAB 0 Refills





Bisacodyl Supp (Biscolax Supp) 10 Mg Supp


10 MG RECTAL DAILY PRN for CONSTIPATION, SUPP 0 Refills





Cholecalciferol (Vitamin D-3) 2,000 Unit Tab


89058 TUESDAY





Coal Tar (Crude) Topical (Coal Tar (Crude) Topical) 20% Soln


1 APPLIC TOPICAL AS DIRECTED, #100 ML 0 Refills





Dextromethorphan (Robafen Cough) 15 Mg Cap


10 MG PO Q4HR PRN for COUGH, CAP 0 Refills





Dextrose Gel (Glutose 15 Gel) 40 % Gel


1 TUBE AS DIRECTED for Blood Sugar Management, #1 TUBE 0 Refills





Diltiazem ER 24 HR (Diltiazem ER 24 HR) 240 Mg Nicole


240 MG PO DAILY, #30 TAB 0 Refills





Docusate Sodium (Docusate Sodium) 100 Mg Cap


100 MG PO BID PRN for CONSTIPATION, #60 CAP 0 Refills





Donepezil (Donepezil) 10 Mg Tab


10 MG PO HS for Dementia, #30 TAB 0 Refills





Famotidine (Famotidine) 20 Mg Tab


20 MG PO DAILY, #60 TAB 0 Refills





Gabapentin (Gabapentin) 100 Mg Cap


100 MG PO HS, #30 CAP 0 Refills





Glucagon (Rdna) Inj Kit (Glucagen Hypokit Inj Kit) 1 Mg Kit


1 MG IM ONCE PRN for Blood Sugar Management, #1 KIT 0 Refills





Hydrocodone-Acetaminophen (Norco) 5 Mg-325 Mg Tab


1 TAB PO Q6H PRN for PAIN, TAB 0 Refills





Insulin Aspart Inj (Novolog Inj) 1,000 Unit/10 Ml Vial


0 SQ AS DIRECTED for Blood Sugar Management, #10 ML 0 Refills


Sliding Scale as directed.


Insulin Detemir Inj (Levemir Inj) 1,000 unit/ 10 ML Vial


22 UNITS SQ DAILY for Blood Sugar Management, VIAL 0 Refills


Do not mix with any other Insulin.


Insulin Detemir Inj (Levemir Inj) 1,000 unit/ 10 ML Vial


30 UNITS SQ HS for Blood Sugar Management, VIAL 0 Refills


Do not mix with any other Insulin.


Isosorbide Mononitrate ER (Isosorbide Mononitrate ER) 60 Mg Tab


60 MG PO DAILY for Prevent Chest Pain, #30 TAB 0 Refills





Levothyroxine (Levothyroxine) 25 Mcg Tab


25 MCG PO DAILY for Thyroid, #30 TAB 0 Refills





Lisinopril (Lisinopril) 10 Mg Tab


10 MG PO DAILY, #30 TAB 0 Refills





Metformin (Metformin) 850 Mg Tab


850 MG PO BIDPC for Blood Sugar Management, TAB 0 Refills





Metoprolol Tartrate (Metoprolol Tartrate) 100 Mg Tab


100 MG PO BID, #60 TAB 0 Refills





Niacin (Niacin) 100 Mg Tab


250 MG PO HS for Nutritional Supplement, #30 TAB 0 Refills





Polyethylene Glycol 3350 Powder (Polyethylene Glycol 3350 Powder) 17 Gram Pow


17 GM PO DAILY for Constipation, #1 BOTTLE 0 Refills

















Jv Eller MD Mar 28, 2018 12:28

## 2018-03-29 LAB
ENDOMYSIAL AB SCREEN: (no result)
ENDOMYSIUM AB TITR SER: (no result) {TITER}
MITOCHONDRIA AB SER QL: (no result) U (ref ?–20)

## 2018-03-31 LAB — CERULOPLASMIN SERPL-MCNC: 27 MG/DL (ref 18–53)

## 2019-02-06 NOTE — RADRPT
EXAM DATE/TIME:  03/25/2018 12:59 

 

[facility] COMPARISON:     

CT ABDOMEN & PELVIS W CONTRAST, March 20, 2018, 7:49.  BILIARY SCAN (HIDA), March 25, 2018, 12:59.  U
S ABDOMEN - LIVER, March 25, 2018, 15:22.

                     

 

INDICATIONS :     

Biliary obstruction.

                     

 

FLUORO TIME:      

8.56 minutes

 

IMAGE COUNT:       

2

                     

 

CONTRAST:     

Instilled by Ordering Physician

                     

 

MEDICAL HISTORY :            

Dementia. Hypercholesterolemia. Chronic obstructive pulmonary disease. GERD. Cerebrovascular accident
. Peripheral vascular disease. Myocardial infarction. Bradycardia. Pulmonary embolism. Kidney stones.
 Arthritis. Diabetes. Depression. Anxiety. Back pain. Shingles. MDRO.    

 

SURGICAL HISTORY :        

Hysterectomy. Appendectomy. Cholecystectomy. Right ureteral stent. Pacemaker.

 

ENCOUNTER:     

Initial                                        

 

ACUITY:     

1 day      

 

PAIN SCORE:     

Non-responsive.

 

LOCATION:       

Abdomen. 

 

FINDINGS:     

An ERCP was performed by the ordering physician.  

 

The images demonstrate minimally dilated extrahepatic biliary tree on the initial image with guidewir
e in place. On the second image, a stent is present in position Crossing the ampulla into the small b
owel. There is satisfactory drainage of the injected contrast.

 

CONCLUSION:     ERCP as above.

 

 

 

 Goyo Bernstein MD on March 27, 2018 at 17:08           

Board Certified Radiologist.

 This report was verified electronically. Topical Retinoid Pregnancy And Lactation Text: This medication is Pregnancy Category C. It is unknown if this medication is excreted in breast milk.